# Patient Record
Sex: FEMALE | Race: BLACK OR AFRICAN AMERICAN | Employment: UNEMPLOYED | URBAN - METROPOLITAN AREA
[De-identification: names, ages, dates, MRNs, and addresses within clinical notes are randomized per-mention and may not be internally consistent; named-entity substitution may affect disease eponyms.]

---

## 2021-07-30 ENCOUNTER — TRANSCRIBE ORDER (OUTPATIENT)
Dept: SCHEDULING | Age: 46
End: 2021-07-30

## 2021-07-30 DIAGNOSIS — Z12.31 VISIT FOR SCREENING MAMMOGRAM: Primary | ICD-10-CM

## 2021-08-09 ENCOUNTER — HOSPITAL ENCOUNTER (OUTPATIENT)
Dept: MAMMOGRAPHY | Age: 46
Discharge: HOME | End: 2021-08-09
Attending: FAMILY MEDICINE
Payer: MEDICAID

## 2021-08-09 DIAGNOSIS — Z12.31 VISIT FOR SCREENING MAMMOGRAM: ICD-10-CM

## 2021-08-09 PROCEDURE — 77063 BREAST TOMOSYNTHESIS BI: CPT

## 2021-11-02 ENCOUNTER — HOSPITAL ENCOUNTER (EMERGENCY)
Age: 46
Discharge: HOME | End: 2021-11-02
Attending: EMERGENCY MEDICINE | Admitting: EMERGENCY MEDICINE
Payer: MEDICAID

## 2021-11-02 VITALS
WEIGHT: 170 LBS | HEART RATE: 98 BPM | RESPIRATION RATE: 18 BRPM | DIASTOLIC BLOOD PRESSURE: 90 MMHG | TEMPERATURE: 97.9 F | HEIGHT: 64 IN | BODY MASS INDEX: 29.02 KG/M2 | SYSTOLIC BLOOD PRESSURE: 147 MMHG | OXYGEN SATURATION: 100 %
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
## 2021-11-02 DIAGNOSIS — S16.1XXA STRAIN OF NECK MUSCLE, INITIAL ENCOUNTER: ICD-10-CM

## 2021-11-02 DIAGNOSIS — S06.0X0A CONCUSSION WITHOUT LOSS OF CONSCIOUSNESS, INITIAL ENCOUNTER: Primary | ICD-10-CM

## 2021-11-02 PROCEDURE — 99282 EMERGENCY DEPT VISIT SF MDM: CPT

## 2021-11-02 RX ORDER — METHOCARBAMOL 750 MG/1
1500 TABLET, FILM COATED ORAL
Qty: 30 TABLET | Refills: 0 | Status: SHIPPED | OUTPATIENT
Start: 2021-11-02 | End: 2022-03-10

## 2021-11-02 RX ORDER — NAPROXEN 500 MG/1
500 TABLET ORAL
Qty: 20 TABLET | Refills: 0 | Status: SHIPPED | OUTPATIENT
Start: 2021-11-02 | End: 2021-11-12

## 2021-11-02 NOTE — ED TRIAGE NOTES
Discharge teaching provided for pt regarding treatment received, medications prescribed, and follow up care. Pt verbalized understanding of all discharge instructions. All questions answered. Pt left ambulatory with discharge paperwork in hand.

## 2021-11-02 NOTE — Clinical Note
[address] [place] [address] [facility]ERGENCY DEPT 
[address] [facility] [address] [phone] Work/School Note Date: 11/2/2021 To Whom It May concern: Bisi Kitchen was seen and treated today in the emergency room by the following provider(s): 
Attending Provider: John Mccrary MD 
Nurse Practitioner: BLANCA Mccabe. Bisi Kitchen is excused from work/school on 11/2/2021 through 11/5/2021. She is medically clear to return to work/school on 11/6/2021.   
  
 
Sincerely, 
 
 
 
 
BLANCA Jung
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
## 2021-11-09 ENCOUNTER — TELEPHONE (OUTPATIENT)
Dept: ORTHOPEDIC SURGERY | Age: 46
End: 2021-11-09
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
## 2021-11-15 ENCOUNTER — OFFICE VISIT (OUTPATIENT)
Dept: ORTHOPEDIC SURGERY | Age: 46
End: 2021-11-15
Payer: MEDICAID

## 2021-11-15 VITALS
WEIGHT: 173 LBS | RESPIRATION RATE: 15 BRPM | BODY MASS INDEX: 29.53 KG/M2 | HEART RATE: 85 BPM | HEIGHT: 64 IN | OXYGEN SATURATION: 98 %
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
## 2021-11-15 DIAGNOSIS — S06.0X0A CONCUSSION WITHOUT LOSS OF CONSCIOUSNESS, INITIAL ENCOUNTER: Primary | ICD-10-CM

## 2021-11-15 PROCEDURE — 99204 OFFICE O/P NEW MOD 45 MIN: CPT | Performed by: FAMILY MEDICINE

## 2021-11-15 RX ORDER — NAPROXEN 500 MG/1
500 TABLET ORAL 2 TIMES DAILY WITH MEALS
COMMUNITY
End: 2022-08-11

## 2021-11-15 RX ORDER — AZELASTINE 1 MG/ML
SPRAY, METERED NASAL
COMMUNITY
Start: 2021-08-27

## 2021-11-15 RX ORDER — TRAZODONE HYDROCHLORIDE 50 MG/1
50-100 TABLET ORAL
Qty: 60 TABLET | Refills: 2 | Status: SHIPPED | OUTPATIENT
Start: 2021-11-15 | End: 2022-03-10 | Stop reason: SDUPTHER
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
## 2021-11-15 NOTE — PATIENT INSTRUCTIONS
Stare at the tip of a pen or pencil and bring toward your eye until it just starts to bother you. Hold for 5 seconds then move it away. Repeat 10 times a set, and do 3 sets a day.            Search YouTube for my channel:    Dr. Luiz Chaudhry

## 2021-11-15 NOTE — LETTER
NOTIFICATION RETURN TO WORK / SCHOOL    11/15/2021 3:29 PM    Ms. David Gonsalves  [id number] [id number] [place] [id number]      To Whom It May Concern: David Gonsalves is currently under the care of S[facility] Yun 2.. Off work until follow-up 12/2/2021. If there are questions or concerns please have the patient contact our office.         Sincerely,      Jorge Alberto Jose, DO

## 2021-12-02 ENCOUNTER — OFFICE VISIT (OUTPATIENT)
Dept: ORTHOPEDIC SURGERY | Age: 46
End: 2021-12-02
Payer: MEDICAID

## 2021-12-02 VITALS
RESPIRATION RATE: 15 BRPM | BODY MASS INDEX: 29.37 KG/M2 | HEIGHT: 64 IN | HEART RATE: 78 BPM | OXYGEN SATURATION: 98 % | WEIGHT: 172 LBS

## 2021-12-02 DIAGNOSIS — S06.0X0D CONCUSSION WITHOUT LOSS OF CONSCIOUSNESS, SUBSEQUENT ENCOUNTER: Primary | ICD-10-CM
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
## 2021-12-02 PROCEDURE — 99215 OFFICE O/P EST HI 40 MIN: CPT | Performed by: FAMILY MEDICINE
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
## 2021-12-02 NOTE — PROGRESS NOTES
Pt has difficulty with word finding, dizziness periodically, flipping letters and words/ images around. Pt states that PT is starting tomorrow.

## 2021-12-03 ENCOUNTER — HOSPITAL ENCOUNTER (OUTPATIENT)
Dept: PHYSICAL THERAPY | Age: 46
Discharge: HOME | End: 2021-12-03
Attending: FAMILY MEDICINE
Payer: MEDICAID

## 2021-12-03 PROCEDURE — 97162 PT EVAL MOD COMPLEX 30 MIN: CPT
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
## 2021-12-03 NOTE — PROGRESS NOTES
PT DAILY TREATMENT NOTE 10-18    Patient Name: Jessica Phan  Date:12/3/2021  : 1975  [x]  Patient  Verified  Payor: Gene Hernandes / [place] / Product Type: Managed Care Medicaid /    In time:1102  Out time:1139  Total Treatment Time (min): 37  Visit #: 1 of 8      Treatment Area: Post concussion syndrome [F07.81]    SUBJECTIVE  Pain Level (0-10 scale): 5  Any medication changes, allergies to medications, adverse drug reactions, diagnosis change, or new procedure performed?: [x] No    [] Yes (see summary sheet for update)  Subjective functional status/changes:   [] No changes reported  See eval    OBJECTIVE      37 min [x]Eval                  []Re-Eval       With   [] TE   [] TA   [] neuro   [] other: Patient Education: [x] Review HEP    [] Progressed/Changed HEP based on:   [] positioning   [] body mechanics   [] transfers   [] heat/ice application    [] other:      Other Objective/Functional Measures:      Pain Level (0-10 scale) post treatment: 5    ASSESSMENT/Changes in Function: see POC    Patient will continue to benefit from skilled PT services to modify and progress therapeutic interventions, address functional mobility deficits, address ROM deficits, address strength deficits, analyze and address soft tissue restrictions, analyze and cue movement patterns and assess and modify postural abnormalities to attain remaining goals. [x]  See Plan of Care  []  See progress note/recertification  []  See Discharge Summary         Progress towards goals / Updated goals:  Short Term Goals: To be accomplished in 2 weeks:  1. I and compliant with HEP for self management of symptoms. IE: issued HEP   2. Decrease CSI by 20 points to indicate improved function for ADLs. IE: 80  Long Term Goals: To be accomplished in 4 weeks:  1. Improve FOTO to 59 to indicate improved function with daily activities. IE: 52  2. Decrease CSI to <=10 to indicate improved function for work tasks.   IE: 119  3. Improve convergence to <= 6 cm to increase ease with reading. IE: 25 cm   4. 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    IE: 2-3 HAs/week  PLAN  []  Upgrade activities as tolerated     [x]  Continue plan of care  []  Update interventions per flow sheet       []  Discharge due to:_  []  Other:_      Noam Deluca, MPT, CMTPT 12/3/2021  12:24 PM    Future Appointments   Date Time Provider Shahid Mora   12/8/2021 12:00 PM Oksana Melgar, PT [facility]   12/10/2021  9:00 AM Werner Teixeira, PT [facility]   12/13/2021  3:30 PM Dominique Bowels, DPT [facility]   12/15/2021 10:15 AM Dominique Bowels, DPT [facility]   12/20/2021  2:00 PM Dominique Bowels, DPT [facility]   12/22/2021 10:15 AM Dominique Bowels, DPT [facility]   12/23/2021 11:00 AM DO MARISSA Núñez BS [facility]   12/27/2021  3:45 PM Werner Teixeira PT [facility]   12/29/2021  9:00 AM Werner Teixeira PT [facility]

## 2021-12-08 ENCOUNTER — HOSPITAL ENCOUNTER (OUTPATIENT)
Dept: PHYSICAL THERAPY | Age: 46
Discharge: HOME | End: 2021-12-08
Attending: FAMILY MEDICINE
Payer: MEDICAID

## 2021-12-08 PROCEDURE — 97530 THERAPEUTIC ACTIVITIES: CPT

## 2021-12-08 PROCEDURE — 97112 NEUROMUSCULAR REEDUCATION: CPT

## 2021-12-08 NOTE — PROGRESS NOTES
PT DAILY TREATMENT NOTE     Patient Name: Andrea Caal  Date:2021  : 1975  [x]  Patient  Verified  Payor: Yanna Lloyd / Plan: [id number] [employer] / Product Type: Managed Care Medicaid /    In time:1203pm  Out time:1249pm  Total Treatment Time (min): 55  Visit #: 2 of 8     Treatment Area: Post concussion syndrome [F07.81]    SUBJECTIVE  Pain Level (0-10 scale): 5  Any medication changes, allergies to medications, adverse drug reactions, diagnosis change, or new procedure performed?: [x] No    [] Yes (see summary sheet for update)  Subjective functional status/changes:   [] No changes reported  Reports focus has been challenging and she has been very fatigued by the end of the day. Looking at emails is an aggravator for sx. OBJECTIVE    23 min Therapeutic Activity:  [x]  See flow sheet : saccades, smooth pursuits, pencil pushes   Rationale: improve coordination  to improve the patients ability to read and write with improved focus. 8 min Neuromuscular Re-education:  [x]  See flow sheet :   Rationale: increase strength, improve coordination, improve balance and increase proprioception  to improve the patients ability to ambulate with improved stability    15 min Manual Therapy:  Pt supine with wedge -- SOR, STM c/s paraspinals, scalenes, and BUT   The manual therapy interventions were performed at a separate and distinct time from the therapeutic activities interventions. Rationale: decrease pain, increase ROM and increase tissue extensibility to improve ease of ADLs and self care.            With   [] TE   [] TA   [] neuro   [] other: Patient Education: [x] Review HEP    [] Progressed/Changed HEP based on:   [] positioning   [] body mechanics   [] transfers   [] heat/ice application    [] other:      Other Objective/Functional Measures: exercises initiated for first f/u per flowsheet     Pain Level (0-10 scale) post treatment: 5    ASSESSMENT/Changes in Function: Pt performs all exercises as directed, but does get quick blurring of vision with visual activities of gaze to the right and gaze down. Challenged by tandem positioning and eyes closed activities. Patient will continue to benefit from skilled PT services to modify and progress therapeutic interventions, address functional mobility deficits, address ROM deficits, address strength deficits, analyze and address soft tissue restrictions, analyze and cue movement patterns, analyze and modify body mechanics/ergonomics and assess and modify postural abnormalities to attain remaining goals. []  See Plan of Care  []  See progress note/recertification  []  See Discharge Summary         Progress towards goals / Updated goals:  Short Term Goals: To be accomplished in 2 weeks:  1. I and compliant with HEP for self management of symptoms. IE: issued HEP   Current: progressing, has been trying once a day, but having difficulty with focus. (12/8/2021)  2. Decrease CSI by 20 points to indicate improved function for ADLs. IE: [address] be accomplished in 4 weeks:  1. Improve FOTO IN 31 YY indicate improved function with daily activities. IE: 52  2. Decrease CSI to <=10 to indicate improved function for work tasks. IE: 119  3. Improve convergence to <= 6 cm to increase ease with reading. 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    PLAN  []  Upgrade activities as tolerated     [x]  Continue plan of care  []  Update interventions per flow sheet       []  Discharge due to:_  []  Other:_      Tyson Casey, PT 12/8/2021  12:32 PM    Future Appointments   Date Time Provider Shahid Mora   12/10/2021  9:00 AM [facility]   12/13/2021  3:30 PM JOVANA MathurT [facility]   12/15/2021 10:15 AM Priscila Palmer DPT [facility]   12/20/2021  2:00 PM Priscila Palmer DPT [facility]   12/22/2021 10:15 AM Hira Culver MY, DPT [facility]   12/23/2021 11:00 AM DO MARISSA Hermosillo BS AMB   12/27/2021  3:45 PM Louis Steen, PT [facility]   12/29/2021  9:00 AM Louis Steen, PT [facility]

## 2021-12-10 ENCOUNTER — HOSPITAL ENCOUNTER (OUTPATIENT)
Dept: PHYSICAL THERAPY | Age: 46
Discharge: HOME | End: 2021-12-10
Attending: FAMILY MEDICINE
Payer: MEDICAID

## 2021-12-10 PROCEDURE — 97112 NEUROMUSCULAR REEDUCATION: CPT

## 2021-12-10 PROCEDURE — 97110 THERAPEUTIC EXERCISES: CPT

## 2021-12-10 PROCEDURE — 97530 THERAPEUTIC ACTIVITIES: CPT

## 2021-12-10 NOTE — PROGRESS NOTES
PT DAILY TREATMENT NOTE 10-18    Patient Name: Daniel Granados  Date:12/10/2021  : 1975  [x]  Patient  Verified  Payor: Conception Dapper / Plan: [id number] [address] [place] / Product Type: Managed Care Medicaid /    In time:907  Out time:948  Total Treatment Time (min): 41  Visit #: 3 of 8   7' late  Treatment Area: Post concussion syndrome [F07.81]    SUBJECTIVE  Pain Level (0-10 scale): 0  Any medication changes, allergies to medications, adverse drug reactions, diagnosis change, or new procedure performed?: [x] No    [] Yes (see summary sheet for update)  Subjective functional status/changes:   [] No changes reported  No pain, just dizzy and my balance is off. Sorry I'm late today. I just found out my boyfriend has to go back to [place]. OBJECTIVE  8 min Therapeutic Exercise:  [] See flow sheet :   Rationale: increase ROM to improve the patients ability to perform ADLs      13 min Therapeutic Activity:  []  See flow sheet :   Rationale: improve coordination, improve balance and increase proprioception  to improve the patients ability to perform ADLs with less dizziness     15 min Neuromuscular Re-education:  []  See flow sheet :   Rationale: increase strength, improve coordination, improve balance and increase proprioception  to improve the patients ability to perform daily activities     5 min Manual Therapy:  SOR; STM B C/S paraspinals, UTs, scalenes   The manual therapy interventions were performed at a separate and distinct time from the therapeutic activities interventions.   Rationale: decrease pain, increase ROM, increase tissue extensibility and decrease trigger points to help with cervicogenic dizziness and post-concussion symptoms  With   [] TE   [] TA   [] neuro   [] other: Patient Education: [x] Review HEP    [] Progressed/Changed HEP based on:   [] positioning   [] body mechanics   [] transfers   [] heat/ice application    [] other:      Other Objective/Functional Measures: CSI 95     Pain Level (0-10 scale) post treatment: 0     ASSESSMENT/Changes in Function: Patient presents with increased stress today, as she just found out her boyfriend is getting deployed. Encouraged patient to continue to listen to her body and take breaks and naps when she feels tired. Right eye fatigues with saccades and smooth pursuit. No LOB with static or dynamic balance. Patient will continue to benefit from skilled PT services to modify and progress therapeutic interventions, analyze and address soft tissue restrictions, analyze and cue movement patterns and address imbalance/dizziness to attain remaining goals. []  See Plan of Care  []  See progress note/recertification  []  See Discharge Summary         Progress towards goals / Updated goals:  Short Term Goals: To be accomplished in 2 weeks:  1. I and compliant with HEP for self management of symptoms. IE: issued HEP   Current: progressing, has been trying once a day, but having difficulty with focus. (12/8/2021)  2. Decrease CSI by 20 points to indicate improved function for ADLs. IE: 119  Current: 95- goal met 12/10/21  Long Term Goals: To be accomplished in 4 weeks:  1. Improve FOTO IG 57 CJ indicate improved function with daily activities.   IE: 47  2. Decrease CSI to <=10 to indicate improved function for work tasks. IE: 119  3. Improve convergence to <= 6 cm to increase ease with reading.   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    PLAN  []  Upgrade activities as tolerated     [x]  Continue plan of care  []  Update interventions per flow sheet       []  Discharge due to:_  []  Other:_      Velasquez Garcia, MPT, CMTPT 12/10/2021  8:47 AM    Future Appointments   Date Time Provider Shahid Mora   12/10/2021  9:00 AM Araceli Santana, PT [facility]   12/14/2021  1:00 PM Alisha Ortiz PTA [facility]   12/15/2021 10:15 AM Kendra Patel DPT [facility]   12/20/2021  1:00 PM Gray Clamp, PTA [facility]   12/22/2021 10:15 AM Felicia Means, DPT [facility]   12/23/2021 11:00 AM DO MARISSA Mcdonald BS AMB   12/27/2021 11:15 AM Judy Dai, PT [facility]   12/29/2021  9:00 AM Judy Dai, PT [facility]

## 2021-12-13 ENCOUNTER — APPOINTMENT (OUTPATIENT)
Dept: PHYSICAL THERAPY | Age: 46
End: 2021-12-13
Attending: FAMILY MEDICINE
Payer: MEDICAID

## 2021-12-15 ENCOUNTER — HOSPITAL ENCOUNTER (OUTPATIENT)
Dept: PHYSICAL THERAPY | Age: 46
Discharge: HOME | End: 2021-12-15
Attending: FAMILY MEDICINE
Payer: MEDICAID

## 2021-12-15 PROCEDURE — 97110 THERAPEUTIC EXERCISES: CPT | Performed by: PHYSICAL THERAPIST

## 2021-12-15 PROCEDURE — 97116 GAIT TRAINING THERAPY: CPT | Performed by: PHYSICAL THERAPIST

## 2021-12-15 PROCEDURE — 97112 NEUROMUSCULAR REEDUCATION: CPT | Performed by: PHYSICAL THERAPIST

## 2021-12-15 NOTE — PROGRESS NOTES
PT DAILY TREATMENT NOTE     Patient Name: Shin Garcia  Date:12/15/2021  : 1975  [x]  Patient  Verified  Payor: Cy Hammans / [place] [employer] / Product Type: Managed Care Medicaid /    In time:1026  Out time:1110  Total Treatment Time (min): 34  Visit #: 4 of 8  11' late    Treatment Area: Post concussion syndrome [F07.81]    SUBJECTIVE  Pain Level (0-10 scale): 0/10 pain  Any medication changes, allergies to medications, adverse drug reactions, diagnosis change, or new procedure performed?: [x] No    [] Yes (see summary sheet for update)  Subjective functional status/changes:   [] No changes reported  Pt reports she continues to be dizzy but does not have pain at this time    OBJECTIVE    8 min Therapeutic Exercise:  [x] See flow sheet :   Rationale: increase ROM and increase strength to improve the patients ability to improve activity tolerance and post concussion symptoms      18 min Neuromuscular Re-education:  [x]  See flow sheet : balance and vestibular ex's per flow sheet   Rationale: improve coordination, improve balance and increase proprioception  to improve the patients ability to improve gait and activity tolerance     8 min Gait Training:  Dynamic gait per flow sheet   Rationale: improve balance and coordination for greater ease w/ grocery shopping          With   [] TE   [] TA   [] neuro   [] other: Patient Education: [x] Review HEP    [] Progressed/Changed HEP based on:   [] positioning   [] body mechanics   [] transfers   [] heat/ice application    [] other:      Other Objective/Functional Measures: CSI = 87     Pain Level (0-10 scale) post treatment: 5/10 dizziness, no pain     ASSESSMENT/Changes in Function: Held manual today 2' no pain reported. Progressed ex's per flow sheet in attempt to improve symptoms. Educated on vestibular changes and how ex's are designed to create improvements.  Educated also on allowing breaks when symptoms begin to elevate to allow better recovery. Patient will continue to benefit from skilled PT services to modify and progress therapeutic interventions, address functional mobility deficits, address ROM deficits, analyze and address soft tissue restrictions, analyze and cue movement patterns, analyze and modify body mechanics/ergonomics, assess and modify postural abnormalities, address imbalance/dizziness and instruct in home and community integration to attain remaining goals. []  See Plan of Care  []  See progress note/recertification  []  See Discharge Summary         Progress towards goals / Updated goals:  Short Term Goals: To be accomplished in 2 weeks:  1. I and compliant with HEP for self management of symptoms. IE: issued HEP   Current: met per pt report 12/15/2021  2. Decrease CSI by 20 points to indicate improved function for ADLs. IE: 119  Current: 95- goal met 12/10/21  Long Term Goals: To be accomplished in 4 weeks:  1. Improve FOTO EP 26 KH indicate improved function with daily activities.   IE: 47  2. Decrease CSI to <=10 to indicate improved function for work tasks. IE: 119  3. Improve convergence to <= 6 cm to increase ease with reading.   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

## 2021-12-20 ENCOUNTER — APPOINTMENT (OUTPATIENT)
Dept: PHYSICAL THERAPY | Age: 46
End: 2021-12-20
Attending: FAMILY MEDICINE
Payer: MEDICAID

## 2021-12-22 ENCOUNTER — HOSPITAL ENCOUNTER (OUTPATIENT)
Dept: PHYSICAL THERAPY | Age: 46
Discharge: HOME | End: 2021-12-22
Attending: FAMILY MEDICINE
Payer: MEDICAID

## 2021-12-22 PROCEDURE — 97110 THERAPEUTIC EXERCISES: CPT | Performed by: PHYSICAL THERAPIST

## 2021-12-22 PROCEDURE — 97112 NEUROMUSCULAR REEDUCATION: CPT | Performed by: PHYSICAL THERAPIST

## 2021-12-22 PROCEDURE — 97116 GAIT TRAINING THERAPY: CPT | Performed by: PHYSICAL THERAPIST

## 2021-12-22 NOTE — PROGRESS NOTES
PT DAILY TREATMENT NOTE     Patient Name: Leatha Siddiqui  Date:2021  : 1975  [x]  Patient  Verified  Payor: Dixon Arzola / [place] [employer] / Product Type: Managed Care Medicaid /    In time:1035  Out time:1123  Total Treatment Time (min): 48  Visit #: 5 of 8  20' late - reprinted calendar     Treatment Area: Post concussion syndrome [F07.81]    SUBJECTIVE  Pain Level (0-10 scale): 6/10 overall symptoms  Any medication changes, allergies to medications, adverse drug reactions, diagnosis change, or new procedure performed?: [x] No    [] Yes (see summary sheet for update)  Subjective functional status/changes:   [] No changes reported  Pt reports she is feeling better overall but not quite herself    OBJECTIVE    10 min Therapeutic Exercise:  [x] See flow sheet : progressed per flow sheet   Rationale: increase ROM to improve the patients ability to improve activity tolerance      28 min Neuromuscular Re-education:  [x]  See flow sheet : balance and vestibular ex's per flow sheet   Rationale: improve coordination, improve balance and increase proprioception  to improve the patients ability to decrease fall risk and improve concussion symptoms    10 min Gait Training: Dynamic gait per flow sheet   Rationale: improve balance and coordination for greater ease w/ grocery shopping          With   [] TE   [] TA   [] neuro   [] other: Patient Education: [x] Review HEP    [] Progressed/Changed HEP based on:   [] positioning   [] body mechanics   [] transfers   [] heat/ice application    [] other:      Other Objective/Functional Measures: see goals below     Pain Level (0-10 scale) post treatment: 6/10    ASSESSMENT/Changes in Function: Pt is making good progress and able to reports about 50% overall improvement. Continues w/ right eye visual weakness reports but it is also improving.  Reports her emotions are still \"all over the place\" but she is able to focus on tasks better and for a longer period of time. Patient will continue to benefit from skilled PT services to modify and progress therapeutic interventions, address functional mobility deficits, address ROM deficits, address strength deficits, analyze and address soft tissue restrictions, analyze and cue movement patterns, analyze and modify body mechanics/ergonomics, assess and modify postural abnormalities, address imbalance/dizziness and instruct in home and community integration to attain remaining goals. []  See Plan of Care  []  See progress note/recertification  []  See Discharge Summary         Progress towards goals / Updated goals:  Short Term Goals: To be accomplished in 2 weeks:  1. I and compliant with HEP for self management of symptoms. IE: issued HEP   Current: met per pt report 12/15/2021  2. Decrease CSI by 20 points to indicate improved function for ADLs. IE: 119  Current: 95- goal met 12/10/21  Long Term Goals: To be accomplished in 4 weeks:  1. Improve FOTO LL 19 YM indicate improved function with daily activities.   IE: 47  Current: progressing, FOTO = 53 12/22/21  2. Decrease CSI to <=10 to indicate improved function for work tasks. IE: 119  Current: progressing, CSI = 77  12/22/21  3. Improve convergence to <= 6 cm to increase ease with reading.   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

## 2021-12-22 NOTE — PROGRESS NOTES
[facility]  [address]  [place], [address]  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 I and compliant with HEP for self management of symptoms. IE: issued HEP   Current: met per pt report   2. Decrease CSI by 20 points to indicate improved function for ADLs. IE: 119  Current: 95- goal met  Long Term Goals: To be accomplished in 4 weeks:  1. Improve FOTO to 59 to indicate improved function with daily activities. IE: 52  Current: progressing, FOTO = 53   2. Decrease CSI to <=10 to indicate improved function for work tasks. IE: 119  Current: progressing, CSI = 77    3. Improve convergence to <= 6 cm to increase ease with reading. 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 Improve FOTO to 59 to indicate improved function with daily activities. PN: progressing, FOTO = 53   2. Decrease CSI to <=10 to indicate improved function for work tasks.   PN: progressing, CSI = 77    3. Improve convergence to <= 6 cm to increase ease with reading. PN: progressing, 16cm  4. 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 PN: progressing, reports 3-4 HAs a week but states intensity is less and duration is less     ASSESSMENT/RECOMMENDATIONS:  Pt is making good progress and able to reports about 50% overall improvement. Continues w/ right eye visual weakness reports but it is also improving. Reports her emotions are still \"all over the place\" but she is able to focus on tasks better and for a longer period of time. Patient will continue to benefit from skilled PT services to modify and progress therapeutic interventions, address functional mobility deficits, address ROM deficits, address strength deficits, analyze and address soft tissue restrictions, analyze and cue movement patterns, analyze and modify body mechanics/ergonomics, assess and modify postural abnormalities, address imbalance/dizziness and instruct in home and community integration to attain remaining goals.     [x]Continue therapy per initial plan/protocol at a frequency of  1-2 x per week for 4 weeks  []Continue therapy with the following recommended changes:_____________________      _____________________________________________________________________  []Discontinue therapy progressing towards or have reached established goals  []Discontinue therapy due to lack of appreciable progress towards goals  []Discontinue therapy due to lack of attendance or compliance  []Await Physician's recommendations/decisions regarding therapy  []Other:________________________________________________________________    Thank you for this referral.    Indiana Hardy DPT 12/22/2021 10:54 AM  NOTE TO PHYSICIAN:  PLEASE COMPLETE THE ORDERS BELOW AND   FAX TO [facility]: [phone]  If you are unable to process this request in 24 hours please contact our office: [phone]    [x]  I have read the above report and request that my patient continue as recommended. I have read the above report and request that my patient continue therapy with the following changes/special instructions:__________________________________________________________  I have read the above report and request that my patient be discharged from therapy.     Physicians signature: ______________________________Date: ______Time:______     Johnny Branham DO

## 2021-12-23 ENCOUNTER — OFFICE VISIT (OUTPATIENT)
Dept: ORTHOPEDIC SURGERY | Age: 46
End: 2021-12-23
Payer: MEDICAID

## 2021-12-23 VITALS
HEART RATE: 90 BPM | OXYGEN SATURATION: 98 % | RESPIRATION RATE: 15 BRPM | HEIGHT: 64 IN | BODY MASS INDEX: 29.3 KG/M2 | WEIGHT: 171.6 LBS
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
## 2021-12-23 DIAGNOSIS — S06.0X0D CONCUSSION WITHOUT LOSS OF CONSCIOUSNESS, SUBSEQUENT ENCOUNTER: Primary | ICD-10-CM

## 2021-12-23 PROCEDURE — 99214 OFFICE O/P EST MOD 30 MIN: CPT | Performed by: FAMILY MEDICINE

## 2021-12-23 NOTE — LETTER
NOTIFICATION RETURN TO WORK / SCHOOL    12/23/2021 11:26 AM    Ms. Best Taylor  [phone] [facility] [address]      To Whom It May Concern: Best Taylor is currently under the care of [facility].. She will return to work/school on: 1/3/2021. Limit up to 4 hours per day up to 5 days per week. Allow accommodations for work station/computer screen and lights (sunglasses/ear plugs if needed). Will return 3 weeks. If there are questions or concerns please have the patient contact our office.         Sincerely,      Ramakrishna Keene, DO

## 2021-12-23 NOTE — LETTER
12/23/2021    Patient: Ryann Ojeda   YOB: 1975   Date of Visit: 12/23/2021     Anca Herrera DO  [address] [facility] [address]  Via Fax: [phone]    Dear Anca Herrera DO,      Thank you for referring Ms. Eda Peña to [place] [address]. for evaluation. My notes for this consultation are attached. If you have questions, please do not hesitate to call me. I look forward to following your patient along with you.       Sincerely,    Erin Griffiths DO
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
## 2021-12-23 NOTE — PROGRESS NOTES
Multitasking causes some issues. Memory loss, word finding and spelling things are getting better but still having issues. Pt states that the pressure to the back of the head has gotten less noticeable. Periods of dizziness. Objects close by is blurred but far away is clearer.

## 2021-12-27 ENCOUNTER — APPOINTMENT (OUTPATIENT)
Dept: PHYSICAL THERAPY | Age: 46
End: 2021-12-27
Attending: FAMILY MEDICINE
Payer: MEDICAID

## 2021-12-28 ENCOUNTER — HOSPITAL ENCOUNTER (EMERGENCY)
Age: 46
Discharge: HOME | End: 2021-12-29
Attending: STUDENT IN AN ORGANIZED HEALTH CARE EDUCATION/TRAINING PROGRAM
Payer: MEDICAID

## 2021-12-28 VITALS
HEIGHT: 64 IN | WEIGHT: 165 LBS | OXYGEN SATURATION: 99 % | DIASTOLIC BLOOD PRESSURE: 90 MMHG | RESPIRATION RATE: 17 BRPM | BODY MASS INDEX: 28.17 KG/M2 | HEART RATE: 112 BPM | SYSTOLIC BLOOD PRESSURE: 136 MMHG | TEMPERATURE: 99.4 F

## 2021-12-28 DIAGNOSIS — E86.0 DEHYDRATION: ICD-10-CM

## 2021-12-28 DIAGNOSIS — Z20.822 SUSPECTED COVID-19 VIRUS INFECTION: Primary | ICD-10-CM

## 2021-12-28 LAB
ALBUMIN SERPL-MCNC: 3.9 G/DL (ref 3.4–5)
ALBUMIN/GLOB SERPL: 0.9 {RATIO} (ref 0.8–1.7)
ALP SERPL-CCNC: 97 U/L (ref 45–117)
ALT SERPL-CCNC: 17 U/L (ref 13–56)
ANION GAP SERPL CALC-SCNC: 5 MMOL/L (ref 3–18)
AST SERPL-CCNC: 18 U/L (ref 10–38)
BASOPHILS # BLD: 0 K/UL (ref 0–0.1)
BASOPHILS NFR BLD: 0 % (ref 0–2)
BILIRUB SERPL-MCNC: 0.3 MG/DL (ref 0.2–1)
BUN SERPL-MCNC: 10 MG/DL (ref 7–18)
BUN/CREAT SERPL: 11 (ref 12–20)
CALCIUM SERPL-MCNC: 9.5 MG/DL (ref 8.5–10.1)
CHLORIDE SERPL-SCNC: 104 MMOL/L (ref 100–111)
CO2 SERPL-SCNC: 26 MMOL/L (ref 21–32)
CREAT SERPL-MCNC: 0.89 MG/DL (ref 0.6–1.3)
DIFFERENTIAL METHOD BLD: ABNORMAL
EOSINOPHIL # BLD: 0 K/UL (ref 0–0.4)
EOSINOPHIL NFR BLD: 0 % (ref 0–5)
ERYTHROCYTE [DISTWIDTH] IN BLOOD BY AUTOMATED COUNT: 13.8 % (ref 11.6–14.5)
GLOBULIN SER CALC-MCNC: 4.5 G/DL (ref 2–4)
GLUCOSE SERPL-MCNC: 124 MG/DL (ref 74–99)
HCG SERPL QL: NEGATIVE
HCT VFR BLD AUTO: 41.4 % (ref 35–45)
HGB BLD-MCNC: 13.5 G/DL (ref 12–16)
IMM GRANULOCYTES # BLD AUTO: 0 K/UL (ref 0–0.04)
IMM GRANULOCYTES NFR BLD AUTO: 0 % (ref 0–0.5)
LIPASE SERPL-CCNC: 88 U/L (ref 73–393)
LYMPHOCYTES # BLD: 0.2 K/UL (ref 0.9–3.6)
LYMPHOCYTES NFR BLD: 2 % (ref 21–52)
MCH RBC QN AUTO: 27.2 PG (ref 24–34)
MCHC RBC AUTO-ENTMCNC: 32.6 G/DL (ref 31–37)
MCV RBC AUTO: 83.3 FL (ref 78–100)
MONOCYTES # BLD: 0.4 K/UL (ref 0.05–1.2)
MONOCYTES NFR BLD: 5 % (ref 3–10)
NEUTS SEG # BLD: 6.8 K/UL (ref 1.8–8)
NEUTS SEG NFR BLD: 92 % (ref 40–73)
NRBC # BLD: 0 K/UL (ref 0–0.01)
NRBC BLD-RTO: 0 PER 100 WBC
PLATELET # BLD AUTO: 281 K/UL (ref 135–420)
PMV BLD AUTO: 9.6 FL (ref 9.2–11.8)
POTASSIUM SERPL-SCNC: 3.6 MMOL/L (ref 3.5–5.5)
PROT SERPL-MCNC: 8.4 G/DL (ref 6.4–8.2)
RBC # BLD AUTO: 4.97 M/UL (ref 4.2–5.3)
SODIUM SERPL-SCNC: 135 MMOL/L (ref 136–145)
WBC # BLD AUTO: 7.4 K/UL (ref 4.6–13.2)

## 2021-12-28 PROCEDURE — 99282 EMERGENCY DEPT VISIT SF MDM: CPT

## 2021-12-28 PROCEDURE — 85025 COMPLETE CBC W/AUTO DIFF WBC: CPT

## 2021-12-28 PROCEDURE — 83690 ASSAY OF LIPASE: CPT

## 2021-12-28 PROCEDURE — 80053 COMPREHEN METABOLIC PANEL: CPT

## 2021-12-28 PROCEDURE — 81001 URINALYSIS AUTO W/SCOPE: CPT

## 2021-12-28 PROCEDURE — 84703 CHORIONIC GONADOTROPIN ASSAY: CPT

## 2021-12-28 RX ORDER — ONDANSETRON 2 MG/ML
4 INJECTION INTRAMUSCULAR; INTRAVENOUS
Status: COMPLETED | OUTPATIENT
Start: 2021-12-28 | End: 2021-12-29

## 2021-12-28 NOTE — Clinical Note
----- Message from Gregory Vega MD sent at 1/9/2017  2:27 PM CST -----  Mild arthritis/narrowing laterally.  No changes medially.  Remainder of exam normal.  With pains posterior medial location, follow-up next week as planned.  If still with significant discomfort will need MRI.   [address] [place] [address] Dr [facility] EMERGENCY DEPT  [phone] [facility] [address] [phone]    Work/School Note    Date: 12/28/2021     To Whom It May concern: Harry Senior was evaluated by the following provider(s):  Attending Provider: Alesha Rios DO  Physician Assistant: Luz Tena virus is suspected. Per the CDC guidelines we recommend home isolation until the following conditions are all met:    1. At least 10 days have passed since symptoms first appeared and  2. At least 24 hours have passed since last fever without the use of fever-reducing medications and  3.  Symptoms (e.g., cough, shortness of breath) have improved      Sincerely,          Mi Johnston PA-C

## 2021-12-28 NOTE — Clinical Note
[address] [place] [address] [address] [facility] EMERGENCY DEPT  [id number] [facility] [address] [address] [phone]    Work/School Note    Date: 12/28/2021     To Whom It May concern: Nathaniel Ruth was evaluated by the following provider(s):  Attending Provider: Bry Tate DO  Physician Assistant: Surjit Sine virus is suspected. Per the CDC guidelines we recommend home isolation until the following conditions are all met:    1. At least 10 days have passed since symptoms first appeared and  2. At least 24 hours have passed since last fever without the use of fever-reducing medications and  3.  Symptoms (e.g., cough, shortness of breath) have improved      Sincerely,          Mi Johnston PA-C

## 2021-12-29 ENCOUNTER — HOSPITAL ENCOUNTER (OUTPATIENT)
Dept: PHYSICAL THERAPY | Age: 46
End: 2021-12-29
Attending: FAMILY MEDICINE
Payer: MEDICAID

## 2021-12-29 LAB
APPEARANCE UR: ABNORMAL
BACTERIA URNS QL MICRO: ABNORMAL /HPF
BILIRUB UR QL: NEGATIVE
COLOR UR: YELLOW
EPITH CASTS URNS QL MICRO: ABNORMAL /LPF (ref 0–5)
GLUCOSE UR STRIP.AUTO-MCNC: NEGATIVE MG/DL
HGB UR QL STRIP: ABNORMAL
KETONES UR QL STRIP.AUTO: 80 MG/DL
LEUKOCYTE ESTERASE UR QL STRIP.AUTO: ABNORMAL
NITRITE UR QL STRIP.AUTO: NEGATIVE
PH UR STRIP: 5.5 [PH] (ref 5–8)
PROT UR STRIP-MCNC: 30 MG/DL
RBC #/AREA URNS HPF: ABNORMAL /HPF (ref 0–5)
SARS-COV-2, COV2: NORMAL
SP GR UR REFRACTOMETRY: >1.03 (ref 1–1.03)
URATE CRY URNS QL MICRO: ABNORMAL
UROBILINOGEN UR QL STRIP.AUTO: 0.2 EU/DL (ref 0.2–1)
WBC URNS QL MICRO: ABNORMAL /HPF (ref 0–5)

## 2021-12-29 PROCEDURE — 96374 THER/PROPH/DIAG INJ IV PUSH: CPT

## 2021-12-29 PROCEDURE — 96375 TX/PRO/DX INJ NEW DRUG ADDON: CPT

## 2021-12-29 PROCEDURE — U0003 INFECTIOUS AGENT DETECTION BY NUCLEIC ACID (DNA OR RNA); SEVERE ACUTE RESPIRATORY SYNDROME CORONAVIRUS 2 (SARS-COV-2) (CORONAVIRUS DISEASE [COVID-19]), AMPLIFIED PROBE TECHNIQUE, MAKING USE OF HIGH THROUGHPUT TECHNOLOGIES AS DESCRIBED BY CMS-2020-01-R: HCPCS

## 2021-12-29 PROCEDURE — 74011250636 HC RX REV CODE- 250/636: Performed by: PHYSICIAN ASSISTANT

## 2021-12-29 PROCEDURE — 74011250637 HC RX REV CODE- 250/637: Performed by: PHYSICIAN ASSISTANT

## 2021-12-29 RX ORDER — IBUPROFEN 800 MG/1
800 TABLET ORAL
Qty: 20 TABLET | Refills: 0 | Status: SHIPPED | OUTPATIENT
Start: 2021-12-29 | End: 2022-01-05

## 2021-12-29 RX ORDER — ACETAMINOPHEN 500 MG
1000 TABLET ORAL
Status: COMPLETED | OUTPATIENT
Start: 2021-12-29 | End: 2021-12-29

## 2021-12-29 RX ORDER — KETOROLAC TROMETHAMINE 30 MG/ML
15 INJECTION, SOLUTION INTRAMUSCULAR; INTRAVENOUS
Status: COMPLETED | OUTPATIENT
Start: 2021-12-29 | End: 2021-12-29

## 2021-12-29 RX ORDER — ONDANSETRON 4 MG/1
4 TABLET, ORALLY DISINTEGRATING ORAL
Qty: 20 TABLET | Refills: 0 | Status: SHIPPED | OUTPATIENT
Start: 2021-12-29

## 2021-12-29 RX ADMIN — ONDANSETRON 4 MG: 2 INJECTION INTRAMUSCULAR; INTRAVENOUS at 03:07

## 2021-12-29 RX ADMIN — ACETAMINOPHEN 1000 MG: 500 TABLET ORAL at 03:07

## 2021-12-29 RX ADMIN — KETOROLAC TROMETHAMINE 15 MG: 30 INJECTION, SOLUTION INTRAMUSCULAR; INTRAVENOUS at 03:07

## 2021-12-29 RX ADMIN — SODIUM CHLORIDE 1000 ML: 900 INJECTION, SOLUTION INTRAVENOUS at 03:12

## 2021-12-29 NOTE — ED TRIAGE NOTES
Pt to ED with complaints of nausea, vomiting, HA, and bilateral lower back pain that started this afternoon and has not improved.

## 2021-12-29 NOTE — DISCHARGE INSTRUCTIONS
[employer] Activation    Thank you for requesting access to [employer]. Please follow the instructions below to securely access and download your online medical record. [employer] allows you to send messages to your doctor, view your test results, renew your prescriptions, schedule appointments, and more. How Do I Sign Up? In your internet browser, go to [email or web address]  Click on the First Time User? Click Here link in the Sign In box. You will be redirect to the New Member Sign Up page. Enter your [employer] Access Code exactly as it appears below. You will not need to use this code after youve completed the sign-up process. If you do not sign up before the expiration date, you must request a new code. [employer] Access Code: Activation code not generated  Current [employer] Status: Active (This is the date your [employer] access code will )    Enter the last four digits of your Social Security Number (xxxx) and Date of Birth (mm/dd/yyyy) as indicated and click Submit. You will be taken to the next sign-up page. Create a [employer] ID. This will be your [employer] login ID and cannot be changed, so think of one that is secure and easy to remember. Create a [employer] password. You can change your password at any time. Enter your Password Reset Question and Answer. This can be used at a later time if you forget your password. Enter your e-mail address. You will receive e-mail notification when new information is available in [address] Click Sign Up. You can now view and download portions of your medical record. Click the [place] link to download a portable copy of your medical information. Additional Information    If you have questions, please visit the Frequently Asked Questions section of the [employer] website at [email or web address] Remember, [employer] is NOT to be used for urgent needs. For medical emergencies, dial 911.

## 2021-12-29 NOTE — ED PROVIDER NOTES
EMERGENCY DEPARTMENT HISTORY AND PHYSICAL EXAM    Date: 12/28/2021  Patient Name: Tony Mansfield    History of Presenting Illness     No chief complaint on file. History Provided By:patient     Chief Complaint: flu like sx   Duration: few days  Timing: acute  Location: entire body   Quality: aching   Severity: moderate  Modifying Factors: none   Associated Symptoms: N/V, chills headaches body aches       Additional History (Context): Tony Mansfield is a 40 y.o. female with PMH optic nerve tumor  who presents with c/o flu like sx for the past few days to include chills, N/V, headaches, and body aches. Denies taking any meds for her sx PTA. No known sick exposures. Pt states she is fully COVID vaccinated. No other complaints reported at this time. PCP: George Waterman., DO    Current Facility-Administered Medications   Medication Dose Route Frequency Provider Last Rate Last Admin    ketorolac (TORADOL) injection 15 mg  15 mg IntraVENous NOW Mi Johnston PA-C        acetaminophen (TYLENOL) tablet 1,000 mg  1,000 mg Oral NOW Mi Johnston PA-C        sodium chloride 0.9 % bolus infusion 1,000 mL  1,000 mL IntraVENous ONCE Mi Johnston PA-C        ondansetron [facility]) injection 4 mg  4 mg IntraVENous NOW Mi Johnston PA-C         Current Outpatient Medications   Medication Sig Dispense Refill    ondansetron (ZOFRAN ODT) 4 mg disintegrating tablet Take 1 Tablet by mouth every eight (8) hours as needed for Nausea or Vomiting. 20 Tablet 0    ibuprofen (MOTRIN) 800 mg tablet Take 1 Tablet by mouth every six (6) hours as needed for Pain for up to 7 days. 20 Tablet 0    naproxen (NAPROSYN) 500 mg tablet Take 500 mg by mouth two (2) times daily (with meals).  azelastine (ASTELIN) 137 mcg (0.1 %) nasal spray       traZODone (DESYREL) 50 mg tablet Take 1-2 Tablets by mouth nightly. Start 1 tab at bed. After 3 days may increase to 2 tabs at bed.  60 Tablet 2    methocarbamoL (Robaxin-750) 750 mg tablet Take 2 Tablets by mouth nightly as needed for Muscle Spasm(s) or Pain. (Patient not taking: Reported on 11/15/2021) 30 Tablet 0       Past History     Past Medical History:  Past Medical History:   Diagnosis Date    Optic nerve tumor        Past Surgical History:  Past Surgical History:   Procedure Laterality Date    HX PARTIAL HYSTERECTOMY  2012       Family History:  Family History   Problem Relation Age of Onset    Cancer Maternal Grandmother        Social History:  Social History     Tobacco Use    Smoking status: Never Smoker    Smokeless tobacco: Never Used   Vaping Use    Vaping Use: Never used   Substance Use Topics    Alcohol use: Yes     Comment: occasionally    Drug use: Never       Allergies:  No Known Allergies      Review of Systems   Review of Systems   Constitutional: Positive for chills. Negative for fever. HENT: Negative. Negative for congestion, ear pain and rhinorrhea. Eyes: Negative. Negative for pain and redness. Respiratory: Negative. Negative for cough, shortness of breath, wheezing and stridor. Cardiovascular: Negative. Negative for chest pain and leg swelling. Gastrointestinal: Positive for nausea and vomiting. Negative for abdominal pain, constipation and diarrhea. Genitourinary: Negative. Negative for dysuria and frequency. Musculoskeletal: Positive for myalgias. Negative for back pain and neck pain. Skin: Negative. Negative for rash and wound. Neurological: Positive for headaches. Negative for dizziness, seizures and syncope. All other systems reviewed and are negative. All Other Systems Negative  Physical Exam     Vitals:    12/28/21 2223   BP: (!) 136/90   Pulse: (!) 112   Resp: 17   Temp: 99.4 °F (37.4 °C)   SpO2: 99%   Weight: 74.8 kg (165 lb)   Height: 5' 4\" (1.626 m)     Physical Exam  Vitals and nursing note reviewed. Constitutional:       General: She is in acute distress. Appearance: She is well-developed. She is not diaphoretic. Comments: mildly distressed   HENT:      Head: Normocephalic and atraumatic. Eyes:      General: No scleral icterus. Right eye: No discharge. Left eye: No discharge. Conjunctiva/sclera: Conjunctivae normal.   Cardiovascular:      Rate and Rhythm: Normal rate and regular rhythm. Heart sounds: Normal heart sounds. No murmur heard. No friction rub. No gallop. Pulmonary:      Effort: Pulmonary effort is normal. No respiratory distress. Breath sounds: Normal breath sounds. No stridor. No wheezing, rhonchi or rales. Abdominal:      General: Bowel sounds are normal. There is no distension. Palpations: Abdomen is soft. Tenderness: There is no abdominal tenderness. There is no guarding. Musculoskeletal:         General: Normal range of motion. Cervical back: Normal range of motion and neck supple. Skin:     General: Skin is warm and dry. Findings: No erythema or rash. Neurological:      Mental Status: She is alert and oriented to person, place, and time. Coordination: Coordination normal.      Comments: Gait is steady and patient exhibits no evidence of ataxia. Patient is able to ambulate without difficulty. No focal neurological deficit noted. No facial droop, slurred speech, or evidence of altered mentation noted on exam.     Psychiatric:         Behavior: Behavior normal.         Thought Content:  Thought content normal.                Diagnostic Study Results     Labs -     Recent Results (from the past 12 hour(s))   URINALYSIS W/ RFLX MICROSCOPIC    Collection Time: 12/28/21 10:25 PM   Result Value Ref Range    Color YELLOW      Appearance CLOUDY      Specific gravity >1.030 (H) 1.005 - 1.030    pH (UA) 5.5 5.0 - 8.0      Protein 30 (A) NEG mg/dL    Glucose Negative NEG mg/dL    Ketone 80 (A) NEG mg/dL    Bilirubin Negative NEG      Blood SMALL (A) NEG      Urobilinogen 0.2 0.2 - 1.0 EU/dL    Nitrites Negative NEG Leukocyte Esterase TRACE (A) NEG     URINE MICROSCOPIC ONLY    Collection Time: 12/28/21 10:25 PM   Result Value Ref Range    WBC 2 to 4 0 - 5 /hpf    RBC 0 to 2 0 - 5 /hpf    Epithelial cells 3+ 0 - 5 /lpf    Bacteria 3+ (A) NEG /hpf    Uric acid crystals FEW (A) NEG     CBC WITH AUTOMATED DIFF    Collection Time: 12/28/21 10:30 PM   Result Value Ref Range    WBC 7.4 4.6 - 13.2 K/uL    RBC 4.97 4.20 - 5.30 M/uL    HGB 13.5 12.0 - 16.0 g/dL    HCT 41.4 35.0 - 45.0 %    MCV 83.3 78.0 - 100.0 FL    MCH 27.2 24.0 - 34.0 PG    MCHC 32.6 31.0 - 37.0 g/dL    RDW 13.8 11.6 - 14.5 %    PLATELET 256 632 - 241 K/uL    MPV 9.6 9.2 - 11.8 FL    NRBC 0.0 0  WBC    ABSOLUTE NRBC 0.00 0.00 - 0.01 K/uL    NEUTROPHILS 92 (H) 40 - 73 %    LYMPHOCYTES 2 (L) 21 - 52 %    MONOCYTES 5 3 - 10 %    EOSINOPHILS 0 0 - 5 %    BASOPHILS 0 0 - 2 %    IMMATURE GRANULOCYTES 0 0.0 - 0.5 %    ABS. NEUTROPHILS 6.8 1.8 - 8.0 K/UL    ABS. LYMPHOCYTES 0.2 (L) 0.9 - 3.6 K/UL    ABS. MONOCYTES 0.4 0.05 - 1.2 K/UL    ABS. EOSINOPHILS 0.0 0.0 - 0.4 K/UL    ABS. BASOPHILS 0.0 0.0 - 0.1 K/UL    ABS. IMM. GRANS. 0.0 0.00 - 0.04 K/UL    DF AUTOMATED     METABOLIC PANEL, COMPREHENSIVE    Collection Time: 12/28/21 10:30 PM   Result Value Ref Range    Sodium 135 (L) 136 - 145 mmol/L    Potassium 3.6 3.5 - 5.5 mmol/L    Chloride 104 100 - 111 mmol/L    CO2 26 21 - 32 mmol/L    Anion gap 5 3.0 - 18 mmol/L    Glucose 124 (H) 74 - 99 mg/dL    BUN 10 7.0 - 18 MG/DL    Creatinine 0.89 0.6 - 1.3 MG/DL    BUN/Creatinine ratio 11 (L) 12 - 20      GFR est AA >60 >60 ml/min/1.73m2    GFR est non-AA >60 >60 ml/min/1.73m2    Calcium 9.5 8.5 - 10.1 MG/DL    Bilirubin, total 0.3 0.2 - 1.0 MG/DL    ALT (SGPT) 17 13 - 56 U/L    AST (SGOT) 18 10 - 38 U/L    Alk.  phosphatase 97 45 - 117 U/L    Protein, total 8.4 (H) 6.4 - 8.2 g/dL    Albumin 3.9 3.4 - 5.0 g/dL    Globulin 4.5 (H) 2.0 - 4.0 g/dL    A-G Ratio 0.9 0.8 - 1.7     HCG QL SERUM    Collection Time: 12/28/21 10:30 PM Result Value Ref Range    HCG, Ql. Negative NEG     LIPASE    Collection Time: 12/28/21 10:30 PM   Result Value Ref Range    Lipase 88 73 - 393 U/L       Radiologic Studies -   No orders to display     CT Results  (Last 48 hours)    None        CXR Results  (Last 48 hours)    None            Medical Decision Making   I am the first provider for this patient. I reviewed the vital signs, available nursing notes, past medical history, past surgical history, family history and social history. Vital Signs-Reviewed the patient's vital signs. Records Reviewed: Mi Johnston PA-C     Procedures:  Procedures    Provider Notes (Medical Decision Making): Impression:  Suspected COVID, dehydration     Labs: CBC unremarkable. Na 135, UA nto convincing of UTI, sent for culture, hcg negative   covid test sent out    Pt treated with IV fluids zofran toradol and tylenol in the ED. Will plan to d/c with self quarantine instructions and pcp follow-up. Return precautions advised. Pt agrees. Mi Johnston PA-C     MED RECONCILIATION:  Current Facility-Administered Medications   Medication Dose Route Frequency    ketorolac (TORADOL) injection 15 mg  15 mg IntraVENous NOW    acetaminophen (TYLENOL) tablet 1,000 mg  1,000 mg Oral NOW    sodium chloride 0.9 % bolus infusion 1,000 mL  1,000 mL IntraVENous ONCE    ondansetron (ZOFRAN) injection 4 mg  4 mg IntraVENous NOW     Current Outpatient Medications   Medication Sig    ondansetron (ZOFRAN ODT) 4 mg disintegrating tablet Take 1 Tablet by mouth every eight (8) hours as needed for Nausea or Vomiting.  ibuprofen (MOTRIN) 800 mg tablet Take 1 Tablet by mouth every six (6) hours as needed for Pain for up to 7 days.  naproxen (NAPROSYN) 500 mg tablet Take 500 mg by mouth two (2) times daily (with meals).  azelastine (ASTELIN) 137 mcg (0.1 %) nasal spray     traZODone (DESYREL) 50 mg tablet Take 1-2 Tablets by mouth nightly. Start 1 tab at bed.   After 3 days may increase to 2 tabs at bed.  methocarbamoL (Robaxin-750) 750 mg tablet Take 2 Tablets by mouth nightly as needed for Muscle Spasm(s) or Pain. (Patient not taking: Reported on 11/15/2021)       Disposition:  D/c    DISCHARGE NOTE:   Patient is stable for discharge at this time. I have discussed all the findings from today's work up with the patient, including lab results and imaging. I have answered all questions. Rx for zofran and motrin given. Rest and close follow-up with the PCP recommended this week. Return to the ED immediately for any new or worsening symptoms. April Lisa Madrigal PA-C     Follow-up Information     Follow up With Specialties Details Why Contact Info    Page Flatness., DO Family Medicine In 1 week  [address] [place] [facility] EMERGENCY DEPT Emergency Medicine  As needed, If symptoms worsen [address] [facility] [address]  [phone]          Current Discharge Medication List      START taking these medications    Details   ondansetron (ZOFRAN ODT) 4 mg disintegrating tablet Take 1 Tablet by mouth every eight (8) hours as needed for Nausea or Vomiting. Qty: 20 Tablet, Refills: 0  Start date: 12/29/2021      ibuprofen (MOTRIN) 800 mg tablet Take 1 Tablet by mouth every six (6) hours as needed for Pain for up to 7 days. Qty: 20 Tablet, Refills: 0  Start date: 12/29/2021, End date: 1/5/2022                 Diagnosis     Clinical Impression:   1. Suspected COVID-19 virus infection    2.  Dehydration

## 2021-12-30 ENCOUNTER — PATIENT OUTREACH (OUTPATIENT)
Dept: CASE MANAGEMENT | Age: 46
End: 2021-12-30

## 2021-12-30 LAB — SARS-COV-2, NAA: DETECTED

## 2022-01-03 ENCOUNTER — PATIENT OUTREACH (OUTPATIENT)
Dept: CASE MANAGEMENT | Age: 47
End: 2022-01-03

## 2022-01-03 ENCOUNTER — APPOINTMENT (OUTPATIENT)
Dept: PHYSICAL THERAPY | Age: 47
End: 2022-01-03
Attending: FAMILY MEDICINE
Payer: MEDICAID

## 2022-01-03 NOTE — PROGRESS NOTES
Contacted patient for Transitions of Care Coordination  follow up. No answer. Left message introducing self, role and reason for call. Requested return call. Contact information provided. Second unsuccessful attempt to contact patient. Episode resolved.

## 2022-01-05 ENCOUNTER — APPOINTMENT (OUTPATIENT)
Dept: PHYSICAL THERAPY | Age: 47
End: 2022-01-05
Attending: FAMILY MEDICINE
Payer: MEDICAID

## 2022-01-10 ENCOUNTER — APPOINTMENT (OUTPATIENT)
Dept: PHYSICAL THERAPY | Age: 47
End: 2022-01-10
Attending: FAMILY MEDICINE
Payer: MEDICAID

## 2022-01-12 ENCOUNTER — APPOINTMENT (OUTPATIENT)
Dept: PHYSICAL THERAPY | Age: 47
End: 2022-01-12
Attending: FAMILY MEDICINE
Payer: MEDICAID

## 2022-01-17 ENCOUNTER — APPOINTMENT (OUTPATIENT)
Dept: PHYSICAL THERAPY | Age: 47
End: 2022-01-17
Attending: FAMILY MEDICINE
Payer: MEDICAID

## 2022-01-18 ENCOUNTER — HOSPITAL ENCOUNTER (OUTPATIENT)
Dept: PHYSICAL THERAPY | Age: 47
Discharge: HOME | End: 2022-01-18
Attending: FAMILY MEDICINE
Payer: MEDICAID

## 2022-01-18 PROCEDURE — 97110 THERAPEUTIC EXERCISES: CPT

## 2022-01-18 PROCEDURE — 97530 THERAPEUTIC ACTIVITIES: CPT

## 2022-01-18 PROCEDURE — 97112 NEUROMUSCULAR REEDUCATION: CPT

## 2022-01-18 NOTE — PROGRESS NOTES
PT DAILY TREATMENT NOTE 10-18    Patient Name: Yashira Peraza  Date:2022  : 1975  [x]  Patient  Verified  Payor: Erik Kanner / Plan: [facility] MEDICAID / Product Type: Managed Care Medicaid /    In EOVT:[id number]  Out time:1113  Total Treatment Time (min): 41  Visit #: 1 of 4-8  Treatment Area: Post concussion syndrome [F07.81]    SUBJECTIVE  Pain Level (0-10 scale): 3  Any medication changes, allergies to medications, adverse drug reactions, diagnosis change, or new procedure performed?: [x] No    [] Yes (see summary sheet for update)  Subjective functional status/changes:   [] No changes reported  I'm doing much better. I got Covid, so that threw things off. 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 OBJECTIVE    10 min Therapeutic Exercise:  [] See flow sheet :   Rationale: increase ROM, increase strength, improve coordination, improve balance and increase proprioception to improve the patients ability to perform daily activities      15 min Neuromuscular Re-education:  []  See flow sheet :   Rationale: improve coordination, improve balance and increase proprioception  to improve the patients ability to ambulate community distances without LOB or dizziness  16 min Therapeutic Activity:  []  See flow sheet :   Rationale: improve coordination, improve balance and increase proprioception  to improve the patients ability to perform ADLs    With   [] TE   [] TA   [] neuro   [] other: Patient Education: [x] Review HEP    [] Progressed/Changed HEP based on:   [] positioning   [] body mechanics   [] transfers   [] heat/ice application    [] other:      Other Objective/Functional Measures:      Pain Level (0-10 scale) post treatment: 2    ASSESSMENT/Changes in Function: Patient presents back to therapy after 3 week break due to Covid. Overall, patient is improving, as her CSI continues to decrease with each visit. Added VOR 2 and VOR cancel today. Able to perform without provocation of symptoms. Reports only having 2 mild HAs/week. Patient will continue to benefit from skilled PT services to modify and progress therapeutic interventions, analyze and cue movement patterns and address imbalance/dizziness to attain remaining goals. []  See Plan of Care  []  See progress note/recertification  []  See Discharge Summary         Progress towards goals / Updated goals:  1. Improve FOTO to 59 to indicate improved function with daily activities.   PN: progressing, FOTO = 53   2. Decrease CSI to <=10 to indicate improved function for work tasks. PN: progressing, CSI = 77    3. Improve convergence to <= 6 cm to increase ease with reading.   PN: progressing, 16cm  Current: 16 cm no change  4. 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   PN: progressing, reports 3-4 HAs a week but states intensity is less and duration is less   Current:2 HAs a week-progressing 1/18/22  PLAN  [x]  Upgrade activities as tolerated     []  Continue plan of care  []  Update interventions per flow sheet       []  Discharge due to:_  []  Other:_      JUDITH Paige, CMTPT 1/18/2022  8:55 AM    Future Appointments   Date Time Provider Shahid Mora   1/18/2022 10:30 AM [facility]   1/20/2022  8:30 AM Marcia Hackett, PT [facility]   1/24/2022 12:30 PM [facility]   1/26/2022  8:45 AM [facility]

## 2022-01-19 ENCOUNTER — APPOINTMENT (OUTPATIENT)
Dept: PHYSICAL THERAPY | Age: 47
End: 2022-01-19
Attending: FAMILY MEDICINE
Payer: MEDICAID

## 2022-01-20 ENCOUNTER — HOSPITAL ENCOUNTER (OUTPATIENT)
Dept: PHYSICAL THERAPY | Age: 47
Discharge: HOME | End: 2022-01-20
Attending: FAMILY MEDICINE
Payer: MEDICAID

## 2022-01-20 PROCEDURE — 97116 GAIT TRAINING THERAPY: CPT

## 2022-01-20 PROCEDURE — 97530 THERAPEUTIC ACTIVITIES: CPT

## 2022-01-20 PROCEDURE — 97112 NEUROMUSCULAR REEDUCATION: CPT

## 2022-01-20 NOTE — PROGRESS NOTES
[facility]  [address]  [place], [address]  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 Improve FOTO to 59 to indicate improved function with daily activities. PN: progressing, FOTO = 53    Current: remains, 50   2. Decrease CSI to <=10 to indicate improved function for work tasks. PN: progressing, CSI = 77    Current: progressing, 67   3. Improve convergence to <= 6 cm to increase ease with reading. PN: progressing, 16cm  Current: 16 cm no change  4. 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 PN: progressing, reports 3-4 HAs a week but states intensity is less and duration is less   Current: 2 HAs a week-progressing     Key Functional Changes: CSI 67    Updated Goals: to be achieved in 4 weeks:  1. Improve FOTO to 59 to indicate improved function with daily activities. PN: remains, 50   2. Decrease CSI to <=10 to indicate improved function for work tasks. PN: progressing, 67   3. Improve convergence to <= 6 cm to increase ease with reading. 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     PN: 2 HAs a week-progressing     ASSESSMENT/RECOMMENDATIONS:  Pt is demonstrating improved Concussion Symptom Index scores from evaluation (119) and progress note (77), scoring a 67 today. Her FOTO score remains similar to her last progress note as functionally she is still limited. Limitations persist with gait, bumping into people in grocery stores, with focus and reading retention with job duties, and with headaches. She also c/o some right sided neck pain following therapy today. As Ms. Bobbi Alvarado is demonstrating improvement towards goal attainment, it is recommended that she continue with skilled PT services at a frequency of 2x/week for the next 4 weeks. Patient will continue to benefit from skilled PT services to modify and progress therapeutic interventions, address functional mobility deficits, address ROM deficits, address strength deficits, analyze and address soft tissue restrictions, analyze and cue movement patterns, analyze and modify body mechanics/ergonomics, assess and modify postural abnormalities, address imbalance/dizziness and instruct in home and community integration to attain remaining goals. [x]Continue therapy per initial plan/protocol at a frequency of  2 x per week for 4 weeks  []Continue therapy with the following recommended changes:_____________________      _____________________________________________________________________  []Discontinue therapy progressing towards or have reached established goals  []Discontinue therapy due to lack of appreciable progress towards goals  []Discontinue therapy due to lack of attendance or compliance  []Await Physician's recommendations/decisions regarding therapy  []Other:________________________________________________________________    Thank you for this referral.    Irvin Ambriz, PT 1/20/2022 9:24 AM  NOTE TO PHYSICIAN:  Geeta Rizzo [address]   [facility]: [phone]  If you are unable to process this request in 24 hours please contact our office: [phone]    [x]  I have read the above report and request that my patient continue as recommended.   I have read the above report and request that my patient continue therapy with the following changes/special instructions:__________________________________________________________  I have read the above report and request that my patient be discharged from therapy.     Physicians signature: ______________________________Date: ______Time:______     Aubrey Speaker, DO

## 2022-01-20 NOTE — PROGRESS NOTES
PT DAILY TREATMENT NOTE     Patient Name: Mar Horta  Date:2022  : 1975  [x]  Patient  Verified  Payor: Alexandre Desouza / Plan: [id number] [employer] / Product Type: Managed Care Medicaid /    In time:832am  Out time:912am  Total Treatment Time (min): 40  Visit #: 2 of 4-8     Treatment Area: Post concussion syndrome [F07.81]    SUBJECTIVE  Pain Level (0-10 scale): 4  Any medication changes, allergies to medications, adverse drug reactions, diagnosis change, or new procedure performed?: [x] No    [] Yes (see summary sheet for update)  Subjective functional status/changes:   [] No changes reported  Some improved processing and retaining of information with reading. Headache today from working yesterday. OBJECTIVE  20 min Therapeutic Activity:  [x]? See flow sheet : saccades, smooth pursuits, pencil pushes   Rationale: improve coordination  to improve the patients ability to read and write with improved focus. 10 min Neuromuscular Re-education:  [x]? See flow sheet :   Rationale: increase strength, improve coordination, improve balance and increase proprioception  to improve the patients ability to ambulate with improved stability     10 min Gait Training: Dynamic gait per flow sheet   Rationale: improve balance and coordination for greater ease w/ grocery shopping          With   [] TE   [] TA   [] neuro   [] other: Patient Education: [x] Review HEP    [] Progressed/Changed HEP based on:   [] positioning   [] body mechanics   [] transfers   [] heat/ice application    [] other:      Other Objective/Functional Measures: see PN     Pain Level (0-10 scale) post treatment: 4    ASSESSMENT/Changes in Function: Pt is demonstrating improved Concussion Symptom Index scores from evaluation (119) and progress note (77), scoring a 67 today. Her FOTO score remains similar to her last progress note as functionally she is still limited.  Limitations persist with gait, bumping into people in grocery [employer], with focus and reading retention with job duties, and with headaches. She also c/o some right sided neck pain following therapy today. As Ms. Rhett Bliss is demonstrating improvement towards goal attainment, it is recommended that she continue with skilled PT services at a frequency of 2x/week for the next 4 weeks. Patient will continue to benefit from skilled PT services to modify and progress therapeutic interventions, address functional mobility deficits, address ROM deficits, address strength deficits, analyze and address soft tissue restrictions, analyze and cue movement patterns, analyze and modify body mechanics/ergonomics, assess and modify postural abnormalities, address imbalance/dizziness and instruct in home and community integration to attain remaining goals. []  See Plan of Care  [x]  See progress note/recertification  []  See Discharge Summary         Progress towards goals / Updated goals:  1. Improve FOTO IF 33 VK indicate improved function with daily activities.   PN: progressing, FOTO = 53    Current: remains, 50 (1/20/2022)  2. Decrease CSI to <=10 to indicate improved function for work tasks. PN: progressing, CSI = 77    Current: progressing, 67 (1/20/2022)  3. Improve convergence to <= 6 cm to increase ease with reading.   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     PN: progressing, reports 3-4 HAs a week but states intensity is less and duration is less   Current: 2 HAs a week-progressing 1/18/2022    PLAN  []  Upgrade activities as tolerated     [x]  Continue plan of care   []  Update interventions per flow sheet       []  Discharge due to:_  []  Other:_      Jm Sanchez, PT 1/20/2022  8:35 AM    Future Appointments   Date Time Provider Shahid Mora   1/24/2022 12:30 PM [facility]   1/26/2022  8:45 AM [facility]

## 2022-01-24 ENCOUNTER — HOSPITAL ENCOUNTER (OUTPATIENT)
Dept: PHYSICAL THERAPY | Age: 47
Discharge: HOME | End: 2022-01-24
Attending: FAMILY MEDICINE
Payer: MEDICAID

## 2022-01-24 PROCEDURE — 97112 NEUROMUSCULAR REEDUCATION: CPT | Performed by: PHYSICAL THERAPIST

## 2022-01-24 PROCEDURE — 97116 GAIT TRAINING THERAPY: CPT | Performed by: PHYSICAL THERAPIST

## 2022-01-24 PROCEDURE — 97530 THERAPEUTIC ACTIVITIES: CPT | Performed by: PHYSICAL THERAPIST

## 2022-01-24 NOTE — PROGRESS NOTES
PT DAILY TREATMENT NOTE     Patient Name: Vitaly Keene  Date:2022  : 1975  [x]  Patient  Verified  Payor: Annie Chapman / Plan: Satish Lynn / Product Type: Managed Care Medicaid /    In time:1235  Out time:130  Total Treatment Time (min): 55  Visit #: 1 of 8    Treatment Area: Post concussion syndrome [F07.81]    SUBJECTIVE  Pain Level (0-10 scale): 4/10  Any medication changes, allergies to medications, adverse drug reactions, diagnosis change, or new procedure performed?: [x] No    [] Yes (see summary sheet for update)  Subjective functional status/changes:   [] No changes reported  Pt reports she woke up with a headache and she's having more light sensitivity now as well. OBJECTIVE    19 min Therapeutic Activity:  [x]  See flow sheet : increased per flow sheet   Rationale: improve coordination and improve balance  to improve the patients ability to decrease concussion symptoms     19 min Neuromuscular Re-education:  [x]  See flow sheet : progressed per flow sheet   Rationale: improve coordination, improve balance and increase proprioception  to improve the patients ability to improve fall risk and pain    7 min Manual Therapy:  Supine: SOR, TPR/DTM to B UT, LS, scalenes, c/s paraspinals, c/s PA's and right down glides grade 3, manual str to B UT and right LS   The manual therapy interventions were performed at a separate and distinct time from the therapeutic activities interventions.   Rationale: decrease pain, increase ROM, increase tissue extensibility, decrease trigger points and increase postural awareness to improve HA's and activity tolerance     10 min Gait Training: Dynamic gait per flow sheet   Rationale:improve balance and coordination for greater ease w/ grocery shopping          With   [] TE   [] TA   [] neuro   [] other: Patient Education: [x] Review HEP    [] Progressed/Changed HEP based on:   [] positioning   [] body mechanics   [] transfers   [] heat/ice application    [] other:      Other Objective/Functional Measures: CSI = 63     Pain Level (0-10 scale) post treatment: 3/10    ASSESSMENT/Changes in Function: Pt making progress and improving activity tolerance. Resumed manual therapy 2' elevated headache and report of clicking in the neck with head turn activities. Improved light sensitivity and HA following session. Reviewed UT and LS stretched for HEP and encouraged to increase frequency of VOR cancellation ex's at home. Patient will continue to benefit from skilled PT services to modify and progress therapeutic interventions, address ROM deficits, analyze and address soft tissue restrictions, analyze and cue movement patterns, address imbalance/dizziness and instruct in home and community integration to attain remaining goals. []  See Plan of Care  []  See progress note/recertification  []  See Discharge Summary         Progress towards goals / Updated goals:  Updated Goals: to be achieved in 4 weeks:  1. Improve FOTO to 59 to indicate improved function with daily activities.   PN: remains, 50   2. Decrease CSI to <=10 to indicate improved function for work tasks. PN: progressing, 67   Current: progressing, CSI = 63 1/24/22  3. Improve convergence to <= 6 cm to increase ease with reading.   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    PN: 2 HAs a week-progressing     PLAN  [x]  Upgrade activities as tolerated     []  Continue plan of care  []  Update interventions per flow sheet       []  Discharge due to:_  []  Other:_      Tina Chou, DPT 1/24/2022  1:05 PM    Future Appointments   Date Time Provider Shahid Mora   1/26/2022  8:45 AM Charles Garcia DPT [facility]   1/31/2022  7:00 AM Karen eKys, PT [facility]   2/2/2022  8:45 AM Charles Garcia DPT [facility]   2/7/2022  8:30 AM Karen Keys, PT [facility]   2/9/2022  8:45 AM Charles Garcia DPT [facility]   2/14/2022  8:30 AM Odilon Espinoza, PT [facility]   2/18/2022  9:45 AM Prince Silverman, PT [facility]

## 2022-01-26 ENCOUNTER — HOSPITAL ENCOUNTER (OUTPATIENT)
Dept: PHYSICAL THERAPY | Age: 47
Discharge: HOME | End: 2022-01-26
Attending: FAMILY MEDICINE
Payer: MEDICAID

## 2022-01-26 PROCEDURE — 97530 THERAPEUTIC ACTIVITIES: CPT | Performed by: PHYSICAL THERAPIST

## 2022-01-26 PROCEDURE — 97116 GAIT TRAINING THERAPY: CPT | Performed by: PHYSICAL THERAPIST

## 2022-01-26 PROCEDURE — 97112 NEUROMUSCULAR REEDUCATION: CPT | Performed by: PHYSICAL THERAPIST

## 2022-01-26 NOTE — PROGRESS NOTES
PT DAILY TREATMENT NOTE     Patient Name: Casper Moment  Date:2022  : 1975  [x]  Patient  Verified  Payor: Lizzeth Lucy / Plan: [id number] / Product Type: Managed Care Medicaid /    In time:855  Out time:945  Total Treatment Time (min): 50  Visit #: 2 of 8      Treatment Area: Post concussion syndrome [F07.81]    SUBJECTIVE  Pain Level (0-10 scale): 3/10  Any medication changes, allergies to medications, adverse drug reactions, diagnosis change, or new procedure performed?: [x] No    [] Yes (see summary sheet for update)  Subjective functional status/changes:   [] No changes reported  Pt reports she is experiencing elevated stress and anxiety as a result of the concussion, more so yesterday than today. OBJECTIVE    20 min Therapeutic Activity:  [x]  See flow sheet :   Rationale: increase strength and improve coordination  to improve the patients ability to improve headaches and visual disturbances     20 min Neuromuscular Re-education:  [x]  See flow sheet : resumed and increased balance and vestibular ex's per flow sheet   Rationale: improve coordination, improve balance and increase proprioception  to improve the patients ability to improve activity tolerance and driving tolerance    10 min Gait Training:  Dynamic gait per flow sheet   Rationale:improve balance and coordination for greater ease w/ grocery shopping          With   [] TE   [] TA   [] neuro   [] other: Patient Education: [x] Review HEP    [] Progressed/Changed HEP based on:   [] positioning   [] body mechanics   [] transfers   [] heat/ice application    [] other:      Other Objective/Functional Measures: CSI = 63     Pain Level (0-10 scale) post treatment: 3/10    ASSESSMENT/Changes in Function: Pt w/ multiple self corrected LOBs during EC tandem. Improving MSR EC. Continue to progress as tolerated.      Patient will continue to benefit from skilled PT services to modify and progress therapeutic interventions, address functional mobility deficits, analyze and address soft tissue restrictions, analyze and modify body mechanics/ergonomics, address imbalance/dizziness and instruct in home and community integration to attain remaining goals. []  See Plan of Care  []  See progress note/recertification  []  See Discharge Summary         Progress towards goals / Updated goals:  Updated Goals: to be achieved in 4 weeks:  1. Improve FOTO to 59 to indicate improved function with daily activities.   PN: remains, 50   2. Decrease CSI to <=10 to indicate improved function for work tasks. PN: progressing, 67   Current: progressing, CSI = 63 1/24/22  3. Improve convergence to <= 6 cm to increase ease with reading.   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    PN: 2 HAs a week-progressing     PLAN  [x]  Upgrade activities as tolerated     []  Continue plan of care  []  Update interventions per flow sheet       []  Discharge due to:_  []  Other:_      Jenna Andino DPT 1/26/2022  8:57 AM    Future Appointments   Date Time Provider Shahid Mora   1/31/2022  7:00 AM Marcia Appl, PT [facility]   2/2/2022  8:45 AM Wilmar Nelson, JOVANAT [facility]   2/7/2022  8:30 AM Marcia Appl, PT [facility]   2/9/2022  8:45 AM Wilmar Nelson, DPT [facility]   2/14/2022  8:30 AM Marcia Appl, PT [facility]   2/18/2022  9:45 AM Johanny Peck, PT [facility]

## 2022-01-31 ENCOUNTER — APPOINTMENT (OUTPATIENT)
Dept: PHYSICAL THERAPY | Age: 47
End: 2022-01-31
Attending: FAMILY MEDICINE
Payer: MEDICAID

## 2022-02-02 ENCOUNTER — HOSPITAL ENCOUNTER (OUTPATIENT)
Dept: PHYSICAL THERAPY | Age: 47
Discharge: HOME | End: 2022-02-02
Attending: FAMILY MEDICINE
Payer: MEDICAID

## 2022-02-02 PROCEDURE — 97112 NEUROMUSCULAR REEDUCATION: CPT | Performed by: PHYSICAL THERAPIST

## 2022-02-02 PROCEDURE — 97530 THERAPEUTIC ACTIVITIES: CPT | Performed by: PHYSICAL THERAPIST

## 2022-02-02 PROCEDURE — 97116 GAIT TRAINING THERAPY: CPT | Performed by: PHYSICAL THERAPIST

## 2022-02-02 NOTE — PROGRESS NOTES
PT DAILY TREATMENT NOTE     Patient Name: Nathaniel Ruth  Date:2022  : 1975  [x]  Patient  Verified  Payor: Amada Cox / Plan: [id number] [employer] / Product Type: Managed Care Medicaid /    In time:855  Out time:943  Total Treatment Time (min): 48  Visit #: 3 of 8      Treatment Area: Post concussion syndrome [F07.81]    SUBJECTIVE  Pain Level (0-10 scale): 610  Any medication changes, allergies to medications, adverse drug reactions, diagnosis change, or new procedure performed?: [x] No    [] Yes (see summary sheet for update)  Subjective functional status/changes:   [] No changes reported  Pt reports she is feeling very overwhelmed and out of sorts today. Reports she is still having difficulty remembering things especially her appointment times. Reports she knew today was at 5 and verbalized it to numerous people and herself but still thought it was at 9 when she started on her way over here. Reports she's done a lot this week already and feels it.      OBJECTIVE    19 min Therapeutic Activity:  [x] See flow sheet :   Rationale: increase ROM, increase strength and improve coordination to improve the patients ability to decrease concussion symptoms     19 min Neuromuscular Re-education:  [x]  See flow sheet : resumed and increased balance and vestibular ex's per flow sheet   Rationale: increase strength, improve coordination, improve balance and increase proprioception  to improve the patients ability to improve concussion symptoms      10 min Gait Training:  Dynamic gait per flow sheet   Rationale:improve balance and coordination for greater ease w/ grocery shopping          With   [] TE   [] TA   [] neuro   [] other: Patient Education: [x] Review HEP    [] Progressed/Changed HEP based on:   [] positioning   [] body mechanics   [] transfers   [] heat/ice application    [] other:      Other Objective/Functional Measures: CSI = 70     Pain Level (0-10 scale) post treatment: 4-5/10    ASSESSMENT/Changes in Function: Pt's CSI score regressed to day due to elevated activity levels this week. Reviewed stress relieving breathing techniques and discussed vision therapy and will send message to Dr. Candis Obrien for thoughts. Frequent VC's to keep visual target in irritable field of vision to improve tolerance and vision disturbances. Pt demo'd and verbalized understanding. Patient will continue to benefit from skilled PT services to modify and progress therapeutic interventions, address functional mobility deficits, analyze and cue movement patterns, address imbalance/dizziness and instruct in home and community integration to attain remaining goals. []  See Plan of Care  []  See progress note/recertification  []  See Discharge Summary         Progress towards goals / Updated goals:  Updated Goals: to be achieved in 4 weeks:  1. Improve FOTO to 59 to indicate improved function with daily activities.   PN: remains, 50   2. Decrease CSI to <=10 to indicate improved function for work tasks. PN: progressing, 67   Current: progressing, CSI = 63 1/24/22  3. Improve convergence to <= 6 cm to increase ease with reading.   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    PN: 2 HAs a week-progressing  Current: progressing, 1 very slight headache this week so far 2/2/22     PLAN  [x]  Upgrade activities as tolerated     []  Continue plan of care  []  Update interventions per flow sheet       []  Discharge due to:_  []  Other:_      Ashley Moore, JOVANAT 2/2/2022  8:58 AM    Future Appointments   Date Time Provider Shahid Mora   2/4/2022  2:15 PM Chel Harris, PT [facility]   2/7/2022  8:30 AM Chel Harris, PT [facility]   2/9/2022  8:45 AM Donovan Espitia DPT [facility]   2/14/2022  8:30 AM Chel Harris, PT [facility]   2/18/2022  9:45 AM Donnie Cedillo, PT [facility]

## 2022-02-04 ENCOUNTER — HOSPITAL ENCOUNTER (OUTPATIENT)
Dept: PHYSICAL THERAPY | Age: 47
Discharge: HOME | End: 2022-02-04
Attending: FAMILY MEDICINE
Payer: MEDICAID

## 2022-02-04 PROCEDURE — 97116 GAIT TRAINING THERAPY: CPT

## 2022-02-04 PROCEDURE — 97112 NEUROMUSCULAR REEDUCATION: CPT

## 2022-02-04 PROCEDURE — 97530 THERAPEUTIC ACTIVITIES: CPT

## 2022-02-04 NOTE — PROGRESS NOTES
PT DAILY TREATMENT NOTE     Patient Name: Rachel Galicia  JRQH:  : 1975  [x]  Patient  Verified  Payor: Lorenza Garnettar / Plan: [id number] / Product Type: Managed Care Medicaid /    In time:219pm  Out time:3pm  Total Treatment Time (min): 41  Visit #: 4 of 8     Treatment Area: Post concussion syndrome [F07.81]    SUBJECTIVE  Pain Level (0-10 scale): 3-4  Any medication changes, allergies to medications, adverse drug reactions, diagnosis change, or new procedure performed?: [x] No    [] Yes (see summary sheet for update)  Subjective functional status/changes:   [] No changes reported  Reports slight headache today over left frontal/temporal lobe. OBJECTIVE  15 min Therapeutic Activity:  [x]? See flow sheet :   Rationale: increase ROM, increase strength and improve coordination to improve the patients ability to decrease concussion symptoms      18 min Neuromuscular Re-education:  [x]? See flow sheet : resumed and increased balance and vestibular ex's per flow sheet   Rationale: increase strength, improve coordination, improve balance and increase proprioception  to improve the patients ability to improve concussion symptoms      8 min Gait Training:  Dynamic gait per flow sheet   Rationale:improve balance and coordination for greater ease w/ grocery shopping          With   [] TE   [] TA   [] neuro   [] other: Patient Education: [x] Review HEP    [] Progressed/Changed HEP based on:   [] positioning   [] body mechanics   [] transfers   [] heat/ice application    [] other:      Other Objective/Functional Measures: exercises per flowsheet     Pain Level (0-10 scale) post treatment: 3-4    ASSESSMENT/Changes in Function: Pt has headache behind left eye with smooth pursuits and saccades, between left eye and ear with VOR tasks, and at the forehead with convergence activities. Is able to complete all activities with short breaks between exercises.  Good reports of no increased symptoms following tx. Patient will continue to benefit from skilled PT services to modify and progress therapeutic interventions, address functional mobility deficits, address ROM deficits, address strength deficits, analyze and address soft tissue restrictions, analyze and cue movement patterns, analyze and modify body mechanics/ergonomics, assess and modify postural abnormalities, address imbalance/dizziness and instruct in home and community integration to attain remaining goals. []  See Plan of Care  []  See progress note/recertification  []  See Discharge Summary         Progress towards goals / Updated goals:  1. Improve FOTO to 59 to indicate improved function with daily activities.   PN: remains, 50   2. Decrease CSI to <=10 to indicate improved function for work tasks. PN: progressing, 67   Current: progressing, CSI = 63 1/24/22  3. Improve convergence to <= 6 cm to increase ease with reading.   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    PN: 2 HAs a week-progressing  Current: progressing, 1 very slight headache this week so far 2/2/22     PLAN  []  Upgrade activities as tolerated     [x]  Continue plan of care  []  Update interventions per flow sheet       []  Discharge due to:_  []  Other:_      Irvin Ambriz, PT 2/4/2022  2:35 PM    Future Appointments   Date Time Provider Shahid Mora   2/7/2022  8:30 AM Deidre Trejo, PT [facility]   2/9/2022  8:45 AM Edwige Marroquin DPT [facility]   2/14/2022  8:30 AM Deidre Trejo PT [facility]   2/18/2022  9:45 AM Chidi Becerra, PT [facility]

## 2022-02-07 ENCOUNTER — HOSPITAL ENCOUNTER (OUTPATIENT)
Dept: PHYSICAL THERAPY | Age: 47
Discharge: HOME | End: 2022-02-07
Attending: FAMILY MEDICINE
Payer: MEDICAID

## 2022-02-07 PROCEDURE — 97112 NEUROMUSCULAR REEDUCATION: CPT

## 2022-02-07 PROCEDURE — 97530 THERAPEUTIC ACTIVITIES: CPT

## 2022-02-07 PROCEDURE — 97116 GAIT TRAINING THERAPY: CPT

## 2022-02-07 NOTE — PROGRESS NOTES
PT DAILY TREATMENT NOTE     Patient Name: Chun Germain  ZRRL:[id number]  : 1975  [x]  Patient  Verified  Payor: Dalton Palma / Plan: [employer] / Product Type: Managed Care Medicaid /    In time:834am  Out time:915am  Total Treatment Time (min): 41  Visit #: 5 of 8     Treatment Area: Post concussion syndrome [F07.81]    SUBJECTIVE  Pain Level (0-10 scale): 3 symptoms, slight HA  Any medication changes, allergies to medications, adverse drug reactions, diagnosis change, or new procedure performed?: [x] No    [] Yes (see summary sheet for update)   Subjective functional status/changes:   [] No changes reported   Headache left frontal lobe area. OBJECTIVE  15 min Therapeutic Activity:  [x]? ? See flow sheet :   Rationale: increase ROM, increase strength and improve coordination to improve the patients ability to decrease concussion symptoms      18 min Neuromuscular Re-education:  [x]? ?  See flow sheet : resumed and increased balance and vestibular ex's per flow sheet   Rationale: increase strength, improve coordination, improve balance and increase proprioception  to improve the patients ability to improve concussion symptoms       8 min Gait Training:  Dynamic gait per flow sheet   Rationale:improve balance and coordination for greater ease w/ grocery shopping                                                                       With   [] TE   [] TA   [] neuro   [] other: Patient Education: [x] Review HEP    [] Progressed/Changed HEP based on:   [] positioning   [] body mechanics   [] transfers   [] heat/ice application    [] other:      Other Objective/Functional Measures: CSI 54     Pain Level (0-10 scale) post treatment: 4, eyes pulsing    ASSESSMENT/Changes in Function: Pt performs all exercises as directed with slight breaks in between activities as directed by therapist. 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 Patient will continue to benefit from skilled PT services to modify and progress therapeutic interventions, address functional mobility deficits, address ROM deficits, address strength deficits, analyze and address soft tissue restrictions, analyze and cue movement patterns, analyze and modify body mechanics/ergonomics, assess and modify postural abnormalities, address imbalance/dizziness and instruct in home and community integration to attain remaining goals. []  See Plan of Care  []  See progress note/recertification  []  See Discharge Summary         Progress towards goals / Updated goals:  1. Improve FOTO to 59 to indicate improved function with daily activities.   PN: remains, 50   2. Decrease CSI to <=10 to indicate improved function for work tasks. PN: progressing, 67   Current: progressing, CSI = 63 1/24/22, CSI 54 on 2/7/2022  3. Improve convergence to <= 6 cm to increase ease with reading.   PN: 16 cm no change   Current: very slow change, 15 cm (2/7/2022)  4. 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    PN: 2 HAs a week-progressing  Current: progressing, 1 very slight headache this week so far 2/2/22        PLAN  []  Upgrade activities as tolerated     [x]  Continue plan of care  []  Update interventions per flow sheet       []  Discharge due to:_  []  Other:_      Arjun Haney, PT 2/7/2022  8:40 AM    Future Appointments   Date Time Provider Shahid Mora   2/9/2022  8:45 AM Pascale Newby, JOVANAT [facility]   2/14/2022  8:30 AM Jim Candelario, PT [facility]   2/18/2022  9:45 AM Dev Mott, PT [facility]

## 2022-02-09 ENCOUNTER — HOSPITAL ENCOUNTER (OUTPATIENT)
Dept: PHYSICAL THERAPY | Age: 47
Discharge: HOME | End: 2022-02-09
Attending: FAMILY MEDICINE
Payer: MEDICAID

## 2022-02-09 PROCEDURE — 97112 NEUROMUSCULAR REEDUCATION: CPT | Performed by: PHYSICAL THERAPIST

## 2022-02-09 PROCEDURE — 97116 GAIT TRAINING THERAPY: CPT | Performed by: PHYSICAL THERAPIST

## 2022-02-09 PROCEDURE — 97530 THERAPEUTIC ACTIVITIES: CPT | Performed by: PHYSICAL THERAPIST

## 2022-02-09 NOTE — PROGRESS NOTES
PT DAILY TREATMENT NOTE     Patient Name: Casper Moment  Date:2022  : 1975  [x]  Patient  Verified  Payor: Lizzeth Lucy / Plan: [id number] / Product Type: Managed Care Medicaid /    In time:857  Out time:944  Total Treatment Time (min): 52  Visit #: 6 of 8    Treatment Area: Post concussion syndrome [F07.81]    SUBJECTIVE  Pain Level (0-10 scale): 2/10  Any medication changes, allergies to medications, adverse drug reactions, diagnosis change, or new procedure performed?: [x] No    [] Yes (see summary sheet for update)  Subjective functional status/changes:   [] No changes reported  Pt reports she feels pretty good today. Reports following last session she had dizziness that lasted longer than usual. Denies known irritant during session to cause that. States dizziness resolved by the end of the day. OBJECTIVE    20 min Therapeutic Activity:  [x]  See flow sheet :   Rationale: increase strength, improve coordination and improve balance  to improve the patients ability to improve activity tolerance and decrease concussion symptoms     17 min Neuromuscular Re-education:  [x]  See flow sheet : vision and balance ex's per flow sheet    Rationale: improve balance and increase proprioception  to improve the patients ability to improve activity tolerance and driving tolerance     10 min Gait Training:  Dynamic gait per flow sheet   Rationale:improve balance and coordination for greater ease w/ grocery shopping          With   [] TE   [] TA   [] neuro   [] other: Patient Education: [x] Review HEP    [] Progressed/Changed HEP based on:   [] positioning   [] body mechanics   [] transfers   [] heat/ice application    [] other:      Other Objective/Functional Measures: CSI = 57     Pain Level (0-10 scale) post treatment: 210    ASSESSMENT/Changes in Function: Pt experienced head spinning following session but remains at a 2/10 for symptoms.  Reports numerous times throughout the session that her body feels fine it's just her head that is unsettled. Discussed journaling to address some of the psychosocial issues she is still having following the concussion. Patient will continue to benefit from skilled PT services to modify and progress therapeutic interventions, address functional mobility deficits, analyze and cue movement patterns, address imbalance/dizziness and instruct in home and community integration to attain remaining goals. []  See Plan of Care  []  See progress note/recertification  []  See Discharge Summary         Progress towards goals / Updated goals:  Updated Goals: to be achieved in 4 weeks:  1. Improve FOTO to 59 to indicate improved function with daily activities.   PN: remains, 50   2. Decrease CSI to <=10 to indicate improved function for work tasks. PN: progressing, 67   Current: progressing, CSI = 63 1/24/22, CSI 54 on 2/7/2022  3. Improve convergence to <= 6 cm to increase ease with reading.   PN: 16 cm no change   Current: very slow change, 15 cm (2/7/2022)  4. 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    PN: 2 HAs a week-progressing  Current: progressing, 1 very slight headache this week so far 2/2/22     PLAN  [x]  Upgrade activities as tolerated     []  Continue plan of care  []  Update interventions per flow sheet       []  Discharge due to:_  []  Other:_      Jonnie Kenney DPT 2/9/2022  9:02 AM    Future Appointments   Date Time Provider Shahid Mora   2/10/2022  2:20 PM DO MARISSA Lopez AMB   2/14/2022  8:30 AM Marlene Tristan, PT [facility]   2/18/2022  9:45 AM Maynor Whitehead, PT [facility]

## 2022-02-10 ENCOUNTER — OFFICE VISIT (OUTPATIENT)
Dept: ORTHOPEDIC SURGERY | Age: 47
End: 2022-02-10
Payer: MEDICAID

## 2022-02-10 VITALS
HEIGHT: 64 IN | OXYGEN SATURATION: 98 % | BODY MASS INDEX: 28.85 KG/M2 | WEIGHT: 169 LBS | RESPIRATION RATE: 15 BRPM | HEART RATE: 74 BPM

## 2022-02-10 DIAGNOSIS — S06.0X0D CONCUSSION WITHOUT LOSS OF CONSCIOUSNESS, SUBSEQUENT ENCOUNTER: Primary | ICD-10-CM
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
## 2022-02-10 PROCEDURE — 99215 OFFICE O/P EST HI 40 MIN: CPT | Performed by: FAMILY MEDICINE

## 2022-02-10 NOTE — LETTER
NOTIFICATION RETURN TO WORK / SCHOOL    2/10/2022 2:53 PM    Ms. Bibi [phone]      To Whom It May Concern: Sathya Ray is currently under the care of [facility] [address] She will return to work/school on: 2/11/2021    Limit up to 2 hours per day up to 5 days per week. Allow accommodations for work station/computer screen and lights (sunglasses/ear plugs if needed). If there are questions or concerns please have the patient contact our office.         Sincerely,      Kasi Mitchell, DO
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
## 2022-02-10 NOTE — PROGRESS NOTES
Pt states that she has had some increase in her emotional status where she is more tearful. Pt states that she has had some episodes of balance issues and dizziness. Pt states that multitasking and focusing issues. Pt states that she is having visual issues. Pt states near sighted very blurred and far sight is good. Pt states that pressure to the back of the head has been relieved but with some sporadic times of feeling someone touching her hair. Memory loss has gotten but has some issues at times. Pt states that she has moments of issues with word finding. While walking she tends to walk in the direction to the right. Pt states that she has off balance issues and it feels like a she being pushed like waves are pushing her.

## 2022-02-10 NOTE — LETTER
2/10/2022    Patient: Donn Meredith   YOB: 1975   Date of Visit: 2/10/2022     Allison Boles DO  [address] [facility], [place] [address]  Via Fax: [phone]    Dear Allison Boles DO,      Thank you for referring Ms. Jonny Garza to [place] [address]. for evaluation. My notes for this consultation are attached. If you have questions, please do not hesitate to call me. I look forward to following your patient along with you.       Sincerely,    Juan Nova DO

## 2022-02-14 ENCOUNTER — HOSPITAL ENCOUNTER (OUTPATIENT)
Dept: PHYSICAL THERAPY | Age: 47
Discharge: HOME | End: 2022-02-14
Attending: FAMILY MEDICINE
Payer: MEDICAID

## 2022-02-14 PROCEDURE — 97112 NEUROMUSCULAR REEDUCATION: CPT

## 2022-02-14 PROCEDURE — 97116 GAIT TRAINING THERAPY: CPT

## 2022-02-14 PROCEDURE — 97530 THERAPEUTIC ACTIVITIES: CPT

## 2022-02-14 NOTE — PROGRESS NOTES
PT DAILY TREATMENT NOTE     Patient Name: Aydee Mallory  Date:2022  : 1975  [x]  Patient  Verified  Payor: Savita Fields / Plan: [employer] / Product Type: Managed Care Medicaid /    In time:840am  Out time:914am  Total Treatment Time (min): 34  Visit #: 7 of 8    Treatment Area: Post concussion syndrome [F07.81]    SUBJECTIVE  Pain Level (0-10 scale): 2  Any medication changes, allergies to medications, adverse drug reactions, diagnosis change, or new procedure performed?: [x] No    [] Yes (see summary sheet for update)  Subjective functional status/changes:   [] No changes reported  Reports she had her f/u with Renu Leo, who educated her that she needs to change her habits and work schedule to reduce prolonged exposure to irritants so she is not a \"detriment\" to her progress. Reports she is trying this week with a more spaced out schedule and considering switching to virtual appts. OBJECTIVE    10 min Therapeutic Activity:  [x]? ?? See flow sheet :   Rationale: increase ROM, increase strength and improve coordination to improve the patients ability to decrease concussion symptoms      18 min Neuromuscular Re-education:  [x]? ??  See flow sheet : resumed and increased balance and vestibular ex's per flow sheet   Rationale: increase strength, improve coordination, improve balance and increase proprioception  to improve the patients ability to improve concussion symptoms       8 min Gait Training:  gait with body on head turns 360's and 180s x120ft followed by 360's with head extension and head flexion 60ft each. Figure 8 head on body movements x 120ft.    Rationale:improve balance and coordination for greater ease w/ grocery shopping          With   [] TE   [] TA   [] neuro   [] other: Patient Education: [x] Review HEP    [] Progressed/Changed HEP based on:   [] positioning   [] body mechanics   [] transfers   [] heat/ice application    [] other:      Other Objective/Functional Measures: see progress towards goals     Pain Level (0-10 scale) post treatment: 2    ASSESSMENT/Changes in Function: Good reports of no increase in frontal HA following session. Continues to have greater difficulty with tandem VOR activities as well as cervical extension with body on head turns. Demonstrates progress with all goals at today's session, with a 56 CSI, reduced HA intensity, and improving convergence capacity. Patient will continue to benefit from skilled PT services to modify and progress therapeutic interventions, address functional mobility deficits, address ROM deficits, address strength deficits, analyze and address soft tissue restrictions, analyze and cue movement patterns, analyze and modify body mechanics/ergonomics, assess and modify postural abnormalities, address imbalance/dizziness and instruct in home and community integration to attain remaining goals. []  See Plan of Care  []  See progress note/recertification  []  See Discharge Summary         Progress towards goals / Updated goals:  Updated Goals: to be achieved in 4 weeks:  1. Improve FOTO to 59 to indicate improved function with daily activities.   PN: remains, 50   2. Decrease CSI to <=10 to indicate improved function for work tasks. PN: progressing, 67   Current: progressing, CSI = 56 (2/14/2022)  3. Improve convergence to <= 6 cm to increase ease with reading.   PN: 16 cm no change   Current: very slow change, 13 cm (2/14/2022)   4. 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    PN: 2 HAs a week-progressing  Current: progressing, MAGANA's \"more spaced out and less frequent\"  Now every 3-4 days versus daily. Less severe.  (2/14/2022)     PLAN  []  Upgrade activities as tolerated     [x]  Continue plan of care  []  Update interventions per flow sheet       []  Discharge due to:_  []  Other:_      Tim Marquis, PT 2/14/2022  8:45 AM    Future Appointments   Date Time Provider Department [place]   2/18/2022  9:45 AM Valerie RICHARDSON PT [facility]   3/10/2022  2:00 PM DO MARISSA [place] AMB

## 2022-02-18 ENCOUNTER — HOSPITAL ENCOUNTER (OUTPATIENT)
Dept: PHYSICAL THERAPY | Age: 47
Discharge: HOME | End: 2022-02-18
Attending: FAMILY MEDICINE
Payer: MEDICAID

## 2022-02-18 PROCEDURE — 97116 GAIT TRAINING THERAPY: CPT

## 2022-02-18 PROCEDURE — 97530 THERAPEUTIC ACTIVITIES: CPT

## 2022-02-18 PROCEDURE — 97112 NEUROMUSCULAR REEDUCATION: CPT

## 2022-02-18 NOTE — PROGRESS NOTES
[facility]  [address]  [place], [address]  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 Improve FOTO to 59 to indicate improved function with daily activities. PN: remains, 50   Current:decreased to 49 (eval was 47)  2. Decrease CSI to <=10 to indicate improved function for work tasks. PN: progressing, 67   Current: progressing, CSI = 56 (2022); 63 22  3. Improve convergence to <= 6 cm to increase ease with reading. PN: 16 cm no change   Current: very slow change, 13 cm (2022); 16 cm no change  4. 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 Less severe. (2022)   Key Functional Changes: Less HAs; has adjusted work schedule and eliminated traveling to  students    Updated Goals: to be achieved in 4 weeks:  1. Improve FOTO to 59 to indicate improved function with daily activities. PN :decreased to 49 (eval was 47)  2. Decrease CSI to <=10 to indicate improved function for work tasks. PN: CSI = 56 (2022); 63 22  3. Improve convergence to <= 6 cm to increase ease with reading. PN: very slow change, 13 cm (2022); 16 cm no change  4. 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 PN: progressing, HA's \"more spaced out and less frequent\"  Now every 3-4 days versus daily. Less severe. (2/14/2022)   Key Functional Changes: Less HAs; has adjusted work schedule and eliminated traveling to  students  CSI increased back up to 61 today. Extremely slow improvement over the last month . CSI increased and FOTO decreased today indicating slow progress. Will continue patient for another month to continue to work towards goals. Patient has adjusted work schedule and has eliminated traveling      ASSESSMENT/RECOMMENDATIONS:  [x]Continue therapy per initial plan/protocol at a frequency of  2 x per week for 4 weeks  []Continue therapy with the following recommended changes:_____________________      _____________________________________________________________________  []Discontinue therapy progressing towards or have reached established goals  []Discontinue therapy due to lack of appreciable progress towards goals  []Discontinue therapy due to lack of attendance or compliance  []Await Physician's recommendations/decisions regarding therapy  []Other:________________________________________________________________    Thank you for this referral.    Anna Marie Cortez, PT 2/18/2022 9:00 AM  NOTE TO PHYSICIAN:  PLEASE COMPLETE THE ORDERS BELOW AND   [facility]: [phone]  If you are unable to process this request in 24 hours please contact our office: [phone]    [x]  I have read the above report and request that my patient continue as recommended. I have read the above report and request that my patient continue therapy with the following changes/special instructions:__________________________________________________________  I have read the above report and request that my patient be discharged from therapy.     Physicians signature: ______________________________Date: ______Time:______     Aleksander Chen DO

## 2022-02-18 NOTE — PROGRESS NOTES
PT DAILY TREATMENT NOTE 10-18    Patient Name: Aydee Mallory  Date:2022  : 1975  [x]  Patient  Verified  Payor: Savita Fields / Plan: Dheeraj Master / Product Type: Managed Care Medicaid /    In time:947  Out time:1031  Total Treatment Time (min): 44  Visit #: 8 of 8  Treatment Area: Post concussion syndrome [F07.81]    SUBJECTIVE  Pain Level (0-10 scale): 0-1  Any medication changes, allergies to medications, adverse drug reactions, diagnosis change, or new procedure performed?: [x] No    [] Yes (see summary sheet for update)  Subjective functional status/changes:   [] No changes reported  Slight HA today. I had to do a deadline to meet yesterday. It was a 12 page report, so that really made me exhausted. OBJECTIVE    15 min Therapeutic Activity:  [x]  See flow sheet :   Rationale: increase strength, improve coordination, improve balance and increase proprioception  to improve the patients ability to perform daily activities      21 min Neuromuscular Re-education:  [x]  See flow sheet :   Rationale: improve coordination, improve balance and increase proprioception  to improve the patients ability to perform ADLs    8 min Gait Training:  gait with body on head turns 360's and 180s x120ft followed by 360's with head extension and head flexion 60ft each. Figure 8 head on body movements x 120ft. Rationale: to increase ease with grocery shopping    With   [] TE   [] TA   [] neuro   [] other: Patient Education: [x] Review HEP    [] Progressed/Changed HEP based on:   [] positioning   [] body mechanics   [] transfers   [] heat/ice application    [] other:      Other Objective/Functional Measures:      Pain Level (0-10 scale) post treatment: 1    ASSESSMENT/Changes in Function: CSI increased back up to 63 today. Extremely slow improvement over the last month . CSI increased and FOTO decreased today indicating slow progress.  Will continue patient for another month to continue to work towards goals. Patient has adjusted work schedule and has eliminated traveling      Patient will continue to benefit from skilled PT services to modify and progress therapeutic interventions, analyze and cue movement patterns and address imbalance/dizziness to attain remaining goals. []  See Plan of Care  [x]  See progress note/recertification  []  See Discharge Summary         Progress towards goals / Updated goals:  Updated Goals: to be achieved in 4 weeks:  1. Improve FOTO to 59 to indicate improved function with daily activities.   PN: remains, 50   Current:decreased to 49 (eval was 47)  2. Decrease CSI to <=10 to indicate improved function for work tasks. PN: progressing, 67   Current: progressing, CSI = 56 (2/14/2022); 63 2/18/22  3. Improve convergence to <= 6 cm to increase ease with reading.   PN: 16 cm no change   Current: very slow change, 13 cm (2/14/2022); 16 cm no change  4. 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    PN: 2 HAs a week-progressing  Current: progressing, MAGANA's \"more spaced out and less frequent\"  Now every 3-4 days versus daily. Less severe.  (2/14/2022)     PLAN  [x]  Upgrade activities as tolerated     []  Continue plan of care  []  Update interventions per flow sheet       []  Discharge due to:_  []  Other:_      Ana Cerrato, MPT, CMTPT 2/18/2022  8:52 AM    Future Appointments   Date Time Provider Shahid Mora   2/18/2022  9:45 AM Mia Frey, PT [facility]   2/21/2022  8:30 AM Lynda Roman Catholic, PT [facility]   2/23/2022  8:45 AM Arben Velez, DPT [facility]   2/28/2022  8:30 AM Lynda Roman Catholic, PT [facility]   3/2/2022  8:45 AM Arben Velez, DPT [facility]   3/7/2022  8:30 AM Lynda Roman Catholic, PT [facility]   3/9/2022  8:45 AM Arben Velez, DPT [facility]   3/10/2022  2:00 PM DO MARISSA Concepcion BS AMB   3/14/2022  8:30 AM Lynda Roman Catholic, PT [facility]

## 2022-02-21 ENCOUNTER — HOSPITAL ENCOUNTER (OUTPATIENT)
Dept: PHYSICAL THERAPY | Age: 47
Discharge: HOME | End: 2022-02-21
Attending: FAMILY MEDICINE
Payer: MEDICAID

## 2022-02-21 PROCEDURE — 97112 NEUROMUSCULAR REEDUCATION: CPT

## 2022-02-21 PROCEDURE — 97530 THERAPEUTIC ACTIVITIES: CPT

## 2022-02-21 PROCEDURE — 97116 GAIT TRAINING THERAPY: CPT

## 2022-02-21 NOTE — PROGRESS NOTES
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 OBJECTIVE    10 min Therapeutic Activity:  [x]? ??? See flow sheet :   Rationale: increase ROM, increase strength and improve coordination to improve the patients ability to decrease concussion symptoms      21 min Neuromuscular Re-education:  [x]? ???  See flow sheet : resumed and increased balance and vestibular ex's per flow sheet   Rationale: increase strength, improve coordination, improve balance and increase proprioception  to improve the patients ability to improve concussion symptoms       8 min Gait Training:  gait with body on head turns 360's and 180s x120ft followed by 360's with head extension and head flexion 60ft each. Figure 8 head on body movements x 120ft.    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 Improved CSI demonstrated and improving stability with eyes closed on foam beam. Pencil pushes are unchanged today, and may benefit from direction to bring the note in slightly closer than comfortable as able. Patient will continue to benefit from skilled PT services to modify and progress therapeutic interventions, address functional mobility deficits, address ROM deficits, address strength deficits, analyze and address soft tissue restrictions, analyze and cue movement patterns, analyze and modify body mechanics/ergonomics, assess and modify postural abnormalities, address imbalance/dizziness and instruct in home and community integration to attain remaining goals. []  See Plan of Care  []  See progress note/recertification  []  See Discharge Summary         Progress towards goals / Updated goals:  1. Improve FOTO to 59 to indicate improved function with daily activities.   PN :decreased to 49 (eval was 47)  2. Decrease CSI to <=10 to indicate improved function for work tasks. PN: CSI =  63  Current: progressing, 48 (2/21/2022)   3. Improve convergence to <= 6 cm to increase ease with reading.    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    PN: progressing, MAGANA's \"more spaced out and less frequent\"  Now every 3-4 days versus daily. Less severe.     PLAN   []  Upgrade activities as tolerated     [x]  Continue plan of care  []  Update interventions per flow sheet       []  Discharge due to:_  []  Other:_      Rand Stone, PT 2/21/2022  8:44 AM    Future Appointments   Date Time Provider Shahid Mora   2/23/2022  8:45 AM [facility]   2/28/2022  8:30 AM [facility]ylah Hartman, [facility]   3/2/2022  8:45 AM Eveline Baileya, DPT [facility]   3/7/2022  8:30 AM Kylah Hartman, PT [facility]   3/9/2022  8:45 AM Eveline Ross, [facility]   3/10/2022  2:00 PM Parul Bañuelos DO [facility] PINA [facility] AMB   3/14/2022  8:30 AM Marlene Tristan, PT [facility]

## 2022-02-23 ENCOUNTER — HOSPITAL ENCOUNTER (OUTPATIENT)
Dept: PHYSICAL THERAPY | Age: 47
Discharge: HOME | End: 2022-02-23
Attending: FAMILY MEDICINE
Payer: MEDICAID

## 2022-02-23 PROCEDURE — 97112 NEUROMUSCULAR REEDUCATION: CPT | Performed by: PHYSICAL THERAPIST

## 2022-02-23 PROCEDURE — 97530 THERAPEUTIC ACTIVITIES: CPT | Performed by: PHYSICAL THERAPIST

## 2022-02-23 PROCEDURE — 97116 GAIT TRAINING THERAPY: CPT | Performed by: PHYSICAL THERAPIST

## 2022-02-23 NOTE — PROGRESS NOTES
PT DAILY TREATMENT NOTE     Patient Name: Emilee Martinez  Date:2022  : 1975  [x]  Patient  Verified  Payor: Othelia Galeazzi / Plan: [id number] / Product Type: Managed Care Medicaid /    In time:847  Out time:948  Total Treatment Time (min): 61  Visit #: 2 of 8    Treatment Area: Post concussion syndrome [F07.81]    SUBJECTIVE  Pain Level (0-10 scale): 1/10  Any medication changes, allergies to medications, adverse drug reactions, diagnosis change, or new procedure performed?: [x] No    [] Yes (see summary sheet for update)  Subjective functional status/changes:   [] No changes reported  Pt reports she is going to see vision therapy today at 220p. OBJECTIVE    24 min Therapeutic Activity:  [x]  See flow sheet : progressed per flow sheet   Rationale: improve coordination and improve balance  to improve the patients ability to improve activity tolerance      27 min Neuromuscular Re-education:  [x]  See flow sheet : balance and vestibular ex's per flow sheet   Rationale: improve coordination, improve balance and increase proprioception  to improve the patients ability to improve grocery shopping and driving tolerance     10 min Gait Training:  Dynamic gait per flow sheet   Rationale: improve balance and coordination for greater ease with grocery shopping          With   [] TE   [] TA   [] neuro   [] other: Patient Education: [x] Review HEP    [] Progressed/Changed HEP based on:   [] positioning   [] body mechanics   [] transfers   [] heat/ice application    [] other:      Other Objective/Functional Measures: CSI = 49     Pain Level (0-10 scale) post treatment: 1-1.5 dizziness    ASSESSMENT/Changes in Function: Pt is progressing well and tolerating increased head movements with ambulation. Progressed vision ex's to increase time and to perform in closer distance to her nose testing focus and convergence. Pt performed well overall and required no rest breaks.      Patient will continue to benefit from skilled PT services to modify and progress therapeutic interventions, analyze and cue movement patterns, address imbalance/dizziness and instruct in home and community integration to attain remaining goals. []  See Plan of Care  []  See progress note/recertification  []  See Discharge Summary         Progress towards goals / Updated goals:  Updated Goals: to be achieved in 4 weeks:  1. Improve FOTO FY 61 ST indicate improved function with daily activities.   PN :decreased to 49 (eval was 47)  2. Decrease CSI to <=10 to indicate improved function for work tasks. PN: CSI =  63  Current: progressing, 48 (2/21/2022)   3. Improve convergence to <= 6 cm to increase ease with reading.    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    PN: progressing, HA's \"more spaced out and less frequent\"  Now every 3-4 days versus daily. Less severe.     PLAN  [x]  Upgrade activities as tolerated     []  Continue plan of care  []  Update interventions per flow sheet       []  Discharge due to:_  []  Other:_      Paxton Colon DPT 2/23/2022  9:05 AM    Future Appointments   Date Time Provider Shahid Mora   2/28/2022  8:30 AM Iram Chandler, PT [facility]   3/2/2022  8:45 AM Sanjay Varma DPT [facility]   3/7/2022  8:30 AM Iram Chandler, PT [facility]   3/9/2022  8:45 AM Sanjay Varma DPT [facility]   3/10/2022  2:00 PM DO [facility]SA Addison AMB   3/14/2022  8:30 AM Iram Sis, PT [facility]

## 2022-02-28 ENCOUNTER — HOSPITAL ENCOUNTER (OUTPATIENT)
Dept: PHYSICAL THERAPY | Age: 47
Discharge: HOME | End: 2022-02-28
Attending: FAMILY MEDICINE
Payer: MEDICAID

## 2022-02-28 PROCEDURE — 97110 THERAPEUTIC EXERCISES: CPT

## 2022-02-28 PROCEDURE — 97116 GAIT TRAINING THERAPY: CPT

## 2022-02-28 PROCEDURE — 97112 NEUROMUSCULAR REEDUCATION: CPT

## 2022-02-28 PROCEDURE — 97530 THERAPEUTIC ACTIVITIES: CPT

## 2022-02-28 NOTE — PROGRESS NOTES
PT DAILY TREATMENT NOTE     Patient Name: Theodor Done  Date:2022  : 1975  [x]  Patient  Verified  Payor: Kamini Varghesekindra / Plan: [id number] / Product Type: Managed Care Medicaid /    In time:832am  Out time:917am  Total Treatment Time (min): 45  Visit #: 3 of 8    Medicare/BCBS Only   Total Timed Codes (min):  45 1:1 Treatment Time:  45       Treatment Area: Post concussion syndrome [F07.81]    SUBJECTIVE  Pain Level (0-10 scale): 1  Any medication changes, allergies to medications, adverse drug reactions, diagnosis change, or new procedure performed?: [x] No    [] Yes (see summary sheet for update)  Subjective functional status/changes:   [] No changes reported  Pt is going to try prism glasses for 6 weeks to see if her double vision improves and if this doesn't help the vision MD will have her come in for more f/u. Will get these new glasses in 7-10 days. Vision doctor told her that her right eye is resting lower than the left and she tilts her head to the right when she is focusing. Pt states she feels like the right eye needs to focus harder. OBJECTIVE    10 min Therapeutic Activity:  [x]? See flow sheet : progressed per flow sheet   Rationale: improve coordination and improve balance  to improve the patients ability to improve activity tolerance      25 min Neuromuscular Re-education:  [x]?   See flow sheet : balance and vestibular ex's per flow sheet   Rationale: improve coordination, improve balance and increase proprioception  to improve the patients ability to improve grocery shopping and driving tolerance      10 min Gait Training:  Dynamic gait per flow sheet   Rationale: improve balance and coordination for greater ease with grocery shopping          With   [] TE   [] TA   [] neuro   [] other: Patient Education: [x] Review HEP    [] Progressed/Changed HEP based on:   [] positioning   [] body mechanics   [] transfers   [] heat/ice application    [] other:      Other Objective/Functional Measures: exercises per flowsheet     Pain Level (0-10 scale) post treatment: 1.5 eye fatigue. ASSESSMENT/Changes in Function: Pt does well with completing all vestibular challenges as assigned. She has some trouble with balance when directed to perform progression of diagonals VOR with tandem stance on airex beam. Pt notes right eye fatigue following session but that her left eye feels better. Patient will continue to benefit from skilled PT services to modify and progress therapeutic interventions, address functional mobility deficits, address ROM deficits, address strength deficits, analyze and address soft tissue restrictions, analyze and cue movement patterns, analyze and modify body mechanics/ergonomics, assess and modify postural abnormalities, address imbalance/dizziness and instruct in home and community integration to attain remaining goals. []  See Plan of Care  []  See progress note/recertification  []  See Discharge Summary         Progress towards goals / Updated goals:  Updated Goals: to be achieved in 4 weeks:  1. Improve FOTO WD 22 VZ indicate improved function with daily activities.   PN :decreased to 49 (eval was 47)  2. Decrease CSI to <=10 to indicate improved function for work tasks. PN: CSI =  63  Current: progressing, 48 (2/21/2022)   3.Improve convergence to <= 6 cm to increase ease with reading.    PN: very slow change, 16 cm no change   4. 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    PN: progressing, HA's \"more spaced out and less frequent\"  Now every 3-4 days versus daily. Less severe.   Current: \"better, one last week\" (2/28/2022)    PLAN  []  Upgrade activities as tolerated     [x]  Continue plan of care   []  Update interventions per flow sheet       []  Discharge due to:_  []  Other:_      Climmie Car, PT 2/28/2022  8:36 AM    Future Appointments   Date Time Provider Shahid Mora   3/2/2022  8:45 AM Jeremy Randall, DPT [facility]   3/7/2022  8:30 AM Patrici Cargo, PT [facility]   3/9/2022  8:45 AM Jeremy Randall, JOVANAT [facility]   3/10/2022  2:00 PM DO MARISSA Roca BS AMB   3/14/2022  8:30 AM Patrici Cargo, PT [facility]

## 2022-03-02 ENCOUNTER — HOSPITAL ENCOUNTER (OUTPATIENT)
Dept: PHYSICAL THERAPY | Age: 47
Discharge: HOME | End: 2022-03-02
Attending: FAMILY MEDICINE
Payer: MEDICAID

## 2022-03-02 PROCEDURE — 97530 THERAPEUTIC ACTIVITIES: CPT | Performed by: PHYSICAL THERAPIST

## 2022-03-02 PROCEDURE — 97116 GAIT TRAINING THERAPY: CPT | Performed by: PHYSICAL THERAPIST

## 2022-03-02 PROCEDURE — 97112 NEUROMUSCULAR REEDUCATION: CPT | Performed by: PHYSICAL THERAPIST

## 2022-03-02 NOTE — PROGRESS NOTES
PT DAILY TREATMENT NOTE     Patient Name: Deronda Sandhoff  Date:3/2/2022  : 1975  [x]  Patient  Verified  Payor: Shelly Tian / Plan: [id number] [employer] / Product Type: Managed Care Medicaid /    In time:846  Out time:930  Total Treatment Time (min): 44  Visit #: 4 of 8    Treatment Area: Post concussion syndrome [F07.81]    SUBJECTIVE  Pain Level (0-10 scale): 10 dizzy  Any medication changes, allergies to medications, adverse drug reactions, diagnosis change, or new procedure performed?: [x] No    [] Yes (see summary sheet for update)  Subjective functional status/changes:   [] No changes reported  Pt reports she had a driving experience on Monday where she had to drive through construction and had a bad experience that ended with getting sick. Reports that carried over into yesterday and even today she's feeling more dizzy than before. OBJECTIVE    10 min Therapeutic Activity:  [x]  See flow sheet : progressed per flow sheet   Rationale: improve coordination and improve balance  to improve the patients ability to improve balance and concussion symptoms     24 min Neuromuscular Re-education:  [x]  See flow sheet : balance and vestibular ex's per flow sheet   Rationale: improve balance and increase proprioception  to improve the patients ability to improve driving and grocery shopping tolerance    10 min Gait Training: dynamic gait per flow sheet   Rationale: improve balance and coordination for greater ease with shopping          With   [] TE   [] TA   [] neuro   [] other: Patient Education: [x] Review HEP    [] Progressed/Changed HEP based on:   [] positioning   [] body mechanics   [] transfers   [] heat/ice application    [] other:      Other Objective/Functional Measures: CSI = 54; convergence = 13cm     Pain Level (0-10 scale) post treatment: 1.5/10 right eye fatigue    ASSESSMENT/Changes in Function: Pt is making progress, still awaiting her prism glasses.  Pt c/o \"terminator\" eye on the right following treatment. Pt completes ex's today with slightly more symptoms during session but minimally increased symptoms following session. Improved tandem balance EC today on foam. Convergence improving slightly. Patient will continue to benefit from skilled PT services to modify and progress therapeutic interventions, analyze and cue movement patterns, assess and modify postural abnormalities, address imbalance/dizziness and instruct in home and community integration to attain remaining goals. []  See Plan of Care  []  See progress note/recertification  []  See Discharge Summary         Progress towards goals / Updated goals:  Updated Goals: to be achieved in 4 weeks:  1. Improve FOTO KT 83 UB indicate improved function with daily activities.   PN :decreased to 49 (eval was 47)  2. Decrease CSI to <=10 to indicate improved function for work tasks. PN: CSI =  63  Current: progressing, 48 (2/21/2022)   3.Improve convergence to <= 6 cm to increase ease with reading.    PN: very slow change, 16 cm no change   Current: progressing, convergence = 13cm 3/2/22  4. 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    PN: progressing, HA's \"more spaced out and less frequent\"  Now every 3-4 days versus daily. Less severe.   Current: \"better, one last week\" (2/28/2022)    PLAN  [x]  Upgrade activities as tolerated     []  Continue plan of care  []  Update interventions per flow sheet       []  Discharge due to:_  []  Other:_      Raquel Champion, ROCAEL 3/2/2022  8:49 AM    Future Appointments   Date Time Provider Shahid Mora   3/7/2022  8:30 AM Laurence Chamorro, PT [facility]   3/9/2022  8:45 AM Josie Ellis DPT [facility]   3/10/2022  2:00 PM DO MARISSA Albert AMB   3/14/2022  8:30 AM Laurence Chamorro, PT [facility]

## 2022-03-07 ENCOUNTER — HOSPITAL ENCOUNTER (OUTPATIENT)
Dept: PHYSICAL THERAPY | Age: 47
Discharge: HOME | End: 2022-03-07
Attending: FAMILY MEDICINE
Payer: MEDICAID

## 2022-03-07 PROCEDURE — 97116 GAIT TRAINING THERAPY: CPT

## 2022-03-07 PROCEDURE — 97530 THERAPEUTIC ACTIVITIES: CPT

## 2022-03-07 PROCEDURE — 97112 NEUROMUSCULAR REEDUCATION: CPT

## 2022-03-07 NOTE — PROGRESS NOTES
PT DAILY TREATMENT NOTE     Patient Name: Tony Mansfield  Date:3/7/2022  : 1975  [x]  Patient  Verified  Payor: [facility] / Plan: Karen Cordoba / Product Type: Managed Care Medicaid /    In time:834AM (therapist)  Out time:909am  Total Treatment Time (min): 35  Visit #: 5 of 8     Treatment Area: Post concussion syndrome [F07.81]    SUBJECTIVE  Pain Level (0-10 scale): 1  Any medication changes, allergies to medications, adverse drug reactions, diagnosis change, or new procedure performed?: [x] No    [] Yes (see summary sheet for update)  Subjective functional status/changes:   [] No changes reported  Reports it took a while for the last episode to resolve and she had to take a longer break from using the computer for work. OBJECTIVE  10 min Therapeutic Activity:  [x]? See flow sheet : progressed per flow sheet   Rationale: improve coordination and improve balance  to improve the patients ability to improve balance and concussion symptoms     17 min Neuromuscular Re-education:  [x]? See flow sheet : balance and vestibular ex's per flow sheet   Rationale: improve balance and increase proprioception  to improve the patients ability to improve driving and grocery shopping tolerance     8 min Gait Traininft each forward and backward gait -- diagonal head on body, 360s and 180s body on head, figure 8 head on body   Rationale: improve balance and coordination for greater ease with shopping          With   [] TE   [] TA   [] neuro   [] other: Patient Education: [x] Review HEP    [] Progressed/Changed HEP based on:   [] positioning   [] body mechanics   [] transfers   [] heat/ice application    [] other:      Other Objective/Functional Measures: CSI 47     Pain Level (0-10 scale) post treatment: 1.5    ASSESSMENT/Changes in Function: Pt does well with quick changes with dynamic gait challenges today.  Overall, pt demonstrates good independence with exercises once instructed on how to do so, requiring guidance more for pacing and pt education on stress for vision and VOR. Patient will continue to benefit from skilled PT services to modify and progress therapeutic interventions, address functional mobility deficits, address strength deficits, analyze and address soft tissue restrictions, analyze and cue movement patterns, analyze and modify body mechanics/ergonomics, assess and modify postural abnormalities, address imbalance/dizziness and instruct in home and community integration to attain remaining goals. []  See Plan of Care  []  See progress note/recertification  []  See Discharge Summary         Progress towards goals / Updated goals:  1. Improve FOTO UD 17 HG indicate improved function with daily activities.   PN decreased to 49 (eval was 47)  2. Decrease CSI to <=10 to indicate improved function for work tasks. PN: CSI =  63  Current: progressing, 47 (3/7/2022)   3.Improve convergence to <= 6 cm to increase ease with reading.    PN: very slow change, 16 cm no change   Current: progressing, convergence = 13cm (3/2/22)  4. 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    PN: progressing, HA's \"more spaced out and less frequent\"  Now every 3-4 days versus daily. Less severe.   Current: \"better, one last week\" (2/28/2022)    PLAN  []  Upgrade activities as tolerated     [x]  Continue plan of care   []  Update interventions per flow sheet       []  Discharge due to:_  []  Other:_      Bill Mccullough, PT 3/7/2022  8:42 AM    Future Appointments   Date Time Provider Shahid Mora   3/9/2022  8:45 AM Wilmar Nelson DPT [facility]   3/10/2022  2:00 PM DO MARISSA Fabian AMB   3/14/2022  8:30 AM Marcia Hackett, PT [facility]

## 2022-03-09 ENCOUNTER — HOSPITAL ENCOUNTER (OUTPATIENT)
Dept: PHYSICAL THERAPY | Age: 47
Discharge: HOME | End: 2022-03-09
Attending: FAMILY MEDICINE
Payer: MEDICAID

## 2022-03-09 PROCEDURE — 97530 THERAPEUTIC ACTIVITIES: CPT | Performed by: PHYSICAL THERAPIST

## 2022-03-09 PROCEDURE — 97116 GAIT TRAINING THERAPY: CPT | Performed by: PHYSICAL THERAPIST

## 2022-03-09 PROCEDURE — 97112 NEUROMUSCULAR REEDUCATION: CPT | Performed by: PHYSICAL THERAPIST

## 2022-03-09 NOTE — PROGRESS NOTES
PT DAILY TREATMENT NOTE     Patient Name: Cassandra Valentine  Date:3/9/2022  : 1975  [x]  Patient  Verified  Payor: [employer] / Plan: Tamara Solos / Product Type: Managed Care Medicaid /    In time:854  Out time:941  Total Treatment Time (min): 47  Visit #: 6 of 8    Treatment Area: Post concussion syndrome [F07.81]    SUBJECTIVE  Pain Level (0-10 scale): 1/10  Any medication changes, allergies to medications, adverse drug reactions, diagnosis change, or new procedure performed?: [x] No    [] Yes (see summary sheet for update)  Subjective functional status/changes:   [] No changes reported  Pt received her prism glasses yesterday evening. Reports at first she was dizzy and had a headache but she had already completed her day and had some increased symptoms. Reports this morning when she put them on everything was great, more crisp, and she even feels that her neck and shoulders are more relaxed.      OBJECTIVE    15 min Therapeutic Activity:  [x]  See flow sheet : progressed per flow sheet   Rationale: improve coordination and improve balance  to improve the patients ability to improve balance and activity tolerance      22 min Neuromuscular Re-education:  [x]  See flow sheet : balance and vestibular ex's per flow sheet   Rationale: improve coordination, improve balance and increase proprioception  to improve the patients ability to improve concussion symptoms     10 min Gait Training: dynamic gait per flow sheet   Rationale: improve balance and coordination for greater ease with shopping            With   [] TE   [] TA   [] neuro   [] other: Patient Education: [x] Review HEP    [] Progressed/Changed HEP based on:   [] positioning   [] body mechanics   [] transfers   [] heat/ice application    [] other:      Other Objective/Functional Measures: CSI = 48     Pain Level (0-10 scale) post treatment: 1/10    ASSESSMENT/Changes in Function: Pt is making progress toward goals though it was slow progress more recently, things seem to be improving with the arrival of her prism glasses yesterday. Plan to continue to monitor and possible decrease to 1x/week to ensure that progress continues with new glasses. Noted today pt was able to better process multi-step directions and maintain balance through dynamic gait activities. Patient will continue to benefit from skilled PT services to modify and progress therapeutic interventions, analyze and cue movement patterns, address imbalance/dizziness and instruct in home and community integration to attain remaining goals. []  See Plan of Care  []  See progress note/recertification  []  See Discharge Summary         Progress towards goals / Updated goals:  Updated Goals: to be achieved in 4 weeks:  1. Improve FOTO DH 75 IZ indicate improved function with daily activities.   PN decreased to 49 (eval was 47)  Current: progressing, FOTO = 51 3/9/22  2. Decrease CSI to <=10 to indicate improved function for work tasks. PN: CSI =  63  Current: progressing, 48 (3/9/2022)   3.Improve convergence to <= 6 cm to increase ease with reading.    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    PN: progressing, HA's \"more spaced out and less frequent\"  Now every 3-4 days versus daily. Less severe.   Current: met, pt reports 70-75% overall improvement in headaches 3/9/2022    PLAN  [x]  Upgrade activities as tolerated     []  Continue plan of care  []  Update interventions per flow sheet       []  Discharge due to:_  []  Other:_      Thi Zamora DPT 3/9/2022  8:52 AM    Future Appointments   Date Time Provider Shahid Mora   3/10/2022  2:00 PM DO MARISSA Concepcion BS AMB   3/14/2022  8:30 AM Lynda Rich, PT [facility]

## 2022-03-09 NOTE — PROGRESS NOTES
[facility]  [address]  [place], [address]  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 Improve FOTO to 59 to indicate improved function with daily activities. PN decreased to 49 (eval was 47)  Current: progressing, FOTO = 51   2. Decrease CSI to <=10 to indicate improved function for work tasks. PN: CSI =  63  Current: progressing, 48  3. Improve convergence to <= 6 cm to increase ease with reading. PN: very slow change, 16 cm no change   Current: progressing, convergence = 13cm   4. 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 PN: progressing, HA's \"more spaced out and less frequent\"  Now every 3-4 days versus daily. Less severe. Current: met, pt reports 70-75% overall improvement in headaches     Key Functional Changes: improved double vision w/prism glasses, improved tolerance to multi-step instructions    Updated Goals: to be achieved in 4 weeks:  1. Improve FOTO to 59 to indicate improved function with daily activities. PN: progressing, FOTO = 51   2. Decrease CSI to <=10 to indicate improved function for work tasks. PN: progressing, 48  3. Improve convergence to <= 6 cm to increase ease with reading.     PN: progressing, convergence = 13cm       ASSESSMENT/RECOMMENDATIONS:  Pt is making progress toward goals though it was slow progress more recently, things seem to be improving with the arrival of her prism glasses yesterday. Plan to continue to monitor and possible decrease to 1x/week to ensure that progress continues with new glasses. Noted today pt was able to better process multi-step directions and maintain balance through dynamic gait activities. Patient will continue to benefit from skilled PT services to modify and progress therapeutic interventions, analyze and cue movement patterns, address imbalance/dizziness and instruct in home and community integration to attain remaining goals. [x]Continue therapy per initial plan/protocol at a frequency of  1-2 x per week for 4 weeks  []Continue therapy with the following recommended changes:_____________________      _____________________________________________________________________  []Discontinue therapy progressing towards or have reached established goals  []Discontinue therapy due to lack of appreciable progress towards goals  []Discontinue therapy due to lack of attendance or compliance  []Await Physician's recommendations/decisions regarding therapy  []Other:________________________________________________________________    Thank you for this referral.    Rosemary Gonzalez DPT 3/9/2022 8:54 AM  NOTE TO PHYSICIAN:  PLEASE COMPLETE THE ORDERS BELOW AND   FAX TO [facility]: [phone]  If you are unable to process this request in 24 hours please contact our office: [phone]    [x]  I have read the above report and request that my patient continue as recommended. I have read the above report and request that my patient continue therapy with the following changes/special instructions:__________________________________________________________  I have read the above report and request that my patient be discharged from therapy.     [phone] signature: ______________________________Date: ______Time:______     Tod

## 2022-03-10 ENCOUNTER — TELEPHONE (OUTPATIENT)
Dept: ORTHOPEDIC SURGERY | Age: 47
End: 2022-03-10

## 2022-03-10 ENCOUNTER — OFFICE VISIT (OUTPATIENT)
Dept: ORTHOPEDIC SURGERY | Age: 47
End: 2022-03-10
Payer: MEDICAID

## 2022-03-10 VITALS
OXYGEN SATURATION: 96 % | RESPIRATION RATE: 16 BRPM | HEART RATE: 91 BPM | WEIGHT: 171.8 LBS | BODY MASS INDEX: 29.33 KG/M2 | HEIGHT: 64 IN

## 2022-03-10 DIAGNOSIS — S06.0X0D CONCUSSION WITHOUT LOSS OF CONSCIOUSNESS, SUBSEQUENT ENCOUNTER: Primary | ICD-10-CM
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
## 2022-03-10 PROCEDURE — 99214 OFFICE O/P EST MOD 30 MIN: CPT | Performed by: FAMILY MEDICINE

## 2022-03-10 RX ORDER — TRAZODONE HYDROCHLORIDE 50 MG/1
50-100 TABLET ORAL
Qty: 60 TABLET | Refills: 2 | Status: SHIPPED | OUTPATIENT
Start: 2022-03-10 | End: 2022-09-06 | Stop reason: ALTCHOICE

## 2022-03-10 NOTE — TELEPHONE ENCOUNTER
Patient called and said she saw Lydia August today. Patient said she spoke to Lydia August about her Vertigo. Patient said she forgot to ask Lydia August if he is able to prescribe her something for the Vertigo. [facility] [address] in [address] [place] [address]. Patient tel. [phone].

## 2022-03-10 NOTE — LETTER
3/10/2022    Patient: Sathya Ray   YOB: 1975   Date of Visit: 3/10/2022     Titus Bustamante DO  [address] [address]  Via Fax: [phone]    Dear Titus Bustamante DO,      Thank you for referring Ms. Marisol Moya to [facility]. for evaluation. My notes for this consultation are attached. If you have questions, please do not hesitate to call me. I look forward to following your patient along with you.       Sincerely,    Kriss Horne DO
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
## 2022-03-10 NOTE — LETTER
NOTIFICATION RETURN TO WORK / SCHOOL    3/10/2022 2:24 PM    [phone]      To Whom It May Concern: Tony Mansfield is currently under the care of [facility] [address] She will return to work/school on: 3/11/2022. Limit up to 3 hours per day up to 5 days per week. Allow accommodations for work station/computer screen and lights (sunglasses/ear plugs if needed). If there are questions or concerns please have the patient contact our office.         Sincerely,      Chalo Triplett, DO

## 2022-03-10 NOTE — TELEPHONE ENCOUNTER
Pt informed of the recommendations for vertigo and medications. Pt thanked writer for call and information.

## 2022-03-10 NOTE — PATIENT INSTRUCTIONS
Search YouTube for my channel:    Dr. Shirley Vigil cuff  Low back/Piriformis  Runner's Knee  Hip Stretches  Plantar Fasciitis  IT Band  Concussion  Pes Anserine/Plica  Morfin Splints  Carpal Tunnel  Neck/Upper back

## 2022-03-14 ENCOUNTER — HOSPITAL ENCOUNTER (OUTPATIENT)
Dept: PHYSICAL THERAPY | Age: 47
Discharge: HOME | End: 2022-03-14
Attending: FAMILY MEDICINE
Payer: MEDICAID

## 2022-03-14 PROCEDURE — 97112 NEUROMUSCULAR REEDUCATION: CPT

## 2022-03-14 PROCEDURE — 97530 THERAPEUTIC ACTIVITIES: CPT

## 2022-03-14 PROCEDURE — 97116 GAIT TRAINING THERAPY: CPT

## 2022-03-14 NOTE — PROGRESS NOTES
PT DAILY TREATMENT NOTE     Patient Name: Greg Hartman  Date:3/14/2022  : 1975  [x]  Patient  Verified  Payor: Debbie De La Cruz / Plan: Owen Oakes / Product Type: Managed Care Medicaid /    In time:834am  Out time:914am  Total Treatment Time (min): 40  Visit #: 1 of 4-8     Treatment Area: Post concussion syndrome [F07.81]    SUBJECTIVE  Pain Level (0-10 scale): 1  Any medication changes, allergies to medications, adverse drug reactions, diagnosis change, or new procedure performed?: [x] No    [] Yes (see summary sheet for update)  Subjective functional status/changes:   [] No changes reported  New prism glasses have been very helpful. Woke up with slight headache but unsure if that is just the weather. Only slight dizziness this morning. OBJECTIVE     15 min Therapeutic Activity:  [x]? See flow sheet : progressed per flow sheet   Rationale: improve coordination and improve balance  to improve the patients ability to improve balance and activity tolerance      15 min Neuromuscular Re-education:  [x]?   See flow sheet : balance and vestibular ex's per flow sheet; obstacle couse with 4 tall hurdles, 1 airex beam tandem walk, cone taps x 4 B, bosu step up x 5 each   Rationale: improve coordination, improve balance and increase proprioception  to improve the patients ability to improve concussion symptoms      10 min Gait Training: dynamic gait per flow sheet   Rationale: improve balance and coordination for greater ease with shopping            With   [] TE   [] TA   [] neuro   [] other: Patient Education: [x] Review HEP    [] Progressed/Changed HEP based on:   [] positioning   [] body mechanics   [] transfers   [] heat/ice application    [] other:      Other Objective/Functional Measures: CSI 39     Pain Level (0-10 scale) post treatment: 1 dizziness    ASSESSMENT/Changes in Function: Pt does well with all set challenges today and demonstrates improved convergence from the last few weeks. Lowest CSI score to date. Recommending reduced visit to 1x/week. Patient will continue to benefit from skilled PT services to modify and progress therapeutic interventions, address functional mobility deficits, analyze and address soft tissue restrictions, analyze and cue movement patterns, analyze and modify body mechanics/ergonomics, assess and modify postural abnormalities, address imbalance/dizziness and instruct in home and community integration to attain remaining goals. []  See Plan of Care  []  See progress note/recertification  []  See Discharge Summary         Progress towards goals / Updated goals:  1. Improve FOTO to 59 to indicate improved function with daily activities.   PN: progressing, FOTO = 51   2. Decrease CSI to <=10 to indicate improved function for work tasks. PN: progressing, 48  Current: progressing, 39 (3/14/2022)  3. Improve convergence to <= 6 cm to increase ease with reading.    PN: progressing, convergence = 13cm   Current: progressing, convergence 11cm (3/14/2022)    PLAN  []  Upgrade activities as tolerated     [x]  Continue plan of care  []  Update interventions per flow sheet       []  Discharge due to:_  []  Other:_      Dalton Ramírez, PT 3/14/2022  8:38 AM    Future Appointments   Date Time Provider Shahid Mora   4/14/2022  1:40 PM DO MARISSA Roca AMB

## 2022-03-16 ENCOUNTER — APPOINTMENT (OUTPATIENT)
Dept: PHYSICAL THERAPY | Age: 47
End: 2022-03-16
Attending: FAMILY MEDICINE
Payer: MEDICAID

## 2022-03-23 ENCOUNTER — HOSPITAL ENCOUNTER (OUTPATIENT)
Dept: PHYSICAL THERAPY | Age: 47
Discharge: HOME | End: 2022-03-23
Attending: FAMILY MEDICINE
Payer: MEDICAID

## 2022-03-23 PROCEDURE — 97116 GAIT TRAINING THERAPY: CPT | Performed by: PHYSICAL THERAPIST

## 2022-03-23 PROCEDURE — 97112 NEUROMUSCULAR REEDUCATION: CPT | Performed by: PHYSICAL THERAPIST

## 2022-03-23 PROCEDURE — 97530 THERAPEUTIC ACTIVITIES: CPT | Performed by: PHYSICAL THERAPIST

## 2022-03-23 NOTE — PROGRESS NOTES
PT DAILY TREATMENT NOTE     Patient Name: Laurel Espinoza  Date:3/23/2022  : 1975  [x]  Patient  Verified  Payor: Lio Flores / Plan: [facility] MEDICAID / Product Type: Managed Care Medicaid /    In time:930  Out time:1012  Total Treatment Time (min): 42  Visit #: 2 of 4-8    Treatment Area: Post concussion syndrome [F07.81]    SUBJECTIVE  Pain Level (0-10 scale): 1/10  Any medication changes, allergies to medications, adverse drug reactions, diagnosis change, or new procedure performed?: [x] No    [] Yes (see summary sheet for update)  Subjective functional status/changes:   [] No changes reported  Pt reports she has driven [place] traffic and it was not as bad as it has been in the past.     OBJECTIVE    15 min Therapeutic Activity:  [x]  See flow sheet : increased per flow sheet   Rationale: improve coordination and improve balance  to improve the patients ability to improve balance and activity tolerance      17 min Neuromuscular Re-education:  [x]  See flow sheet : increased balance and vestibular ex's per flow sheet   Rationale: improve coordination, improve balance and increase proprioception  to improve the patients ability to decrease dizziness     10 min Gait Training: dynamic gait per flow sheet    Rationale: improve balance and coordination for greater ease with shopping          With   [] TE   [] TA   [] neuro   [] other: Patient Education: [x] Review HEP    [] Progressed/Changed HEP based on:   [] positioning   [] body mechanics   [] transfers   [] heat/ice application    [] other:      Other Objective/Functional Measures: CSI = 29     Pain Level (0-10 scale) post treatment: 1/10    ASSESSMENT/Changes in Function: Pt continues to progress and has improved convergence to 10 cm, lowest CSI to date today. Multiple LOB w/ self correction during tandem stance.      Patient will continue to benefit from skilled PT services to modify and progress therapeutic interventions, analyze and cue movement patterns, address imbalance/dizziness and instruct in home and community integration to attain remaining goals. []  See Plan of Care  []  See progress note/recertification  []  See Discharge Summary         Progress towards goals / Updated goals:  1. Improve FOTO HX 02 TE indicate improved function with daily activities.   PN: progressing, FOTO = 51   2. Decrease CSI to <=10 to indicate improved function for work tasks. PN: progressing, 48  Current: progressing, 29 (3/23/2022)  3. Improve convergence to <= 6 cm to increase ease with reading.    PN: progressing, convergence = 13cm   Current: progressing, convergence 10cm (3/23/2022)    PLAN  [x]  Upgrade activities as tolerated     []  Continue plan of care  []  Update interventions per flow sheet       []  Discharge due to:_  []  Other:_      Paxton Colon DPT 3/23/2022  9:40 AM    Future Appointments   Date Time Provider [facility]   3/28/2022  9:00 AM Orlin RICHARDSON, PT [facility]   4[facility]2022 10:30 [facility]   4/13/2022  9:30 AM [facility]   4/14/2022  1:40 PM DO MARISSA Addison AMB

## 2022-03-28 ENCOUNTER — HOSPITAL ENCOUNTER (OUTPATIENT)
Dept: PHYSICAL THERAPY | Age: 47
Discharge: HOME | End: 2022-03-28
Attending: FAMILY MEDICINE
Payer: MEDICAID

## 2022-03-28 PROCEDURE — 97112 NEUROMUSCULAR REEDUCATION: CPT

## 2022-03-28 PROCEDURE — 97116 GAIT TRAINING THERAPY: CPT

## 2022-03-28 PROCEDURE — 97530 THERAPEUTIC ACTIVITIES: CPT

## 2022-03-28 NOTE — PROGRESS NOTES
PT DAILY TREATMENT NOTE 10-18    Patient Name: Laura Whitley  Date:3/28/2022  : 1975  [x]  Patient  Verified  Payor: Carrington Huang / Plan[place] [employer] / Product Type: Managed Care Medicaid /    In time:906  Out time:943  Total Treatment Time (min): 37  Visit #: 3 of 4-8      Treatment Area: Post concussion syndrome [F07.81]    SUBJECTIVE  Pain Level (0-10 scale): 1  Any medication changes, allergies to medications, adverse drug reactions, diagnosis change, or new procedure performed?: [x] No    [] Yes (see summary sheet for update)  Subjective functional status/changes:   [] No changes reported  Very slight HA. OBJECTIVE  15 min Therapeutic Activity:  [x]  See flow sheet :   Rationale: improve coordination, improve balance and increase proprioception  to improve the patients ability to improve balance and activity tolerance     12 min Neuromuscular Re-education:  [x]  See flow sheet :   Rationale: improve coordination, improve balance and increase proprioception  to improve the patients ability to decrease dizziness  10 min Gait Training: dynamic gait per flow sheet   Rationale:  improve balance and coordination for greater ease with shopping    With   [] TE   [] TA   [] neuro   [] other: Patient Education: [x] Review HEP    [] Progressed/Changed HEP based on:   [] positioning   [] body mechanics   [] transfers   [] heat/ice application    [] other:      Other Objective/Functional Measures: CSI 42     Pain Level (0-10 scale) post treatment: 1    ASSESSMENT/Changes in Function: Slight symptom provocation with standing VOR horizontal.    Patient will continue to benefit from skilled PT services to modify and progress therapeutic interventions, analyze and cue movement patterns and address imbalance/dizziness to attain remaining goals. []  See Plan of Care  []  See progress note/recertification  []  See Discharge Summary         Progress towards goals / Updated goals:  1.  Improve FOTO JS 53 QW indicate improved function with daily activities.   PN: progressing, FOTO = 51   2. Decrease CSI to <=10 to indicate improved function for work tasks. PN: progressing, 48  Current: progressing, 29 (3/23/2022)  3. Improve convergence to <= 6 cm to increase ease with reading.    PN: progressing, convergence = 13cm   Current: progressing, convergence 10cm (3/23/2022)     PLAN  [x]  Upgrade activities as tolerated     []  Continue plan of care  []  Update interventions per flow sheet       []  Discharge due to:_  []  Other:_      Delmi Conquest, MPT, CMTPT 3/28/2022  8:43 AM    Future Appointments   Date Time Provider Shahid Amini   3/28/2022  9:00 AM [facility]   4/5/2022 10:30 AM Lui Jane, PT [facility]   4/13/2022  3:45 PM Lui Jane, PT [facility]   4/14/2022  1:40 PM DO MARISSA Sheldon AMB

## 2022-04-05 ENCOUNTER — HOSPITAL ENCOUNTER (OUTPATIENT)
Dept: PHYSICAL THERAPY | Age: 47
Discharge: HOME | End: 2022-04-05
Attending: FAMILY MEDICINE
Payer: MEDICAID

## 2022-04-05 PROCEDURE — 97116 GAIT TRAINING THERAPY: CPT

## 2022-04-05 PROCEDURE — 97112 NEUROMUSCULAR REEDUCATION: CPT

## 2022-04-05 PROCEDURE — 97530 THERAPEUTIC ACTIVITIES: CPT

## 2022-04-05 NOTE — PROGRESS NOTES
PT DAILY TREATMENT NOTE     Patient Name: Landy King  Date:2022  : 1975  [x]  Patient  Verified  Payor: Ok Kayser / Plan: [id number] / Product Type: Managed Care Medicaid /    In time:1030am  Out time:1114am  Total Treatment Time (min): 44  Visit #: 4 of 4    Treatment Area: Post concussion syndrome [F07.81]    SUBJECTIVE  Pain Level (0-10 scale): 0.5 sx  Any medication changes, allergies to medications, adverse drug reactions, diagnosis change, or new procedure performed?: [x] No    [] Yes (see summary sheet for update)  Subjective functional status/changes:   [] No changes reported  Driving will initiate vertigo when the sun and construction get intense but 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 OBJECTIVE     15 min Therapeutic Activity:  [x]? See flow sheet : increased per flow sheet   Rationale: improve coordination and improve balance  to improve the patients ability to improve balance and activity tolerance      19 min Neuromuscular Re-education:  [x]? See flow sheet : increased balance and vestibular ex's per flow sheet   Rationale: improve coordination, improve balance and increase proprioception  to improve the patients ability to decrease dizziness      10 min Gait Training: dynamic gait per flow sheet    Rationale: improve balance and coordination for greater ease with shopping           With   [] TE   [] TA   [] neuro   [] other: Patient Education: [x] Review HEP    [] Progressed/Changed HEP based on:   [] positioning   [] body mechanics   [] transfers   [] heat/ice application    [] other:      Other Objective/Functional Measures: see PN     Pain Level (0-10 scale) post treatment: 0-1 sx    ASSESSMENT/Changes in Function: Pt demonstrates improving concussion symptoms from last month 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 She will still get bouts of vertigo when she is overwhelmed by her environment.  Convergence past 10 cm flares up double vision, but otherwise her convergence glasses from her vision therapist have been helping greatly. Therapist emphasized the importance of continuing to perform daily submaximal challenge with HEP as the healing process takes time. Pt would like to keep her last schedule visit in order to receive more detailed discharge instructions, ask questions following her next vision therapy appointment, and update her HEP. Will continue x 1 visit then discharge. Patient will continue to benefit from skilled PT services to modify and progress therapeutic interventions, address functional mobility deficits, address strength deficits, analyze and address soft tissue restrictions, analyze and cue movement patterns, analyze and modify body mechanics/ergonomics, assess and modify postural abnormalities, address imbalance/dizziness and instruct in home and community integration to attain remaining goals. []  See Plan of Care  [x]  See progress note/recertification  []  See Discharge Summary         Progress towards goals / Updated goals:   1. Improve FOTO PU 29 ZS indicate improved function with daily activities.   PN: progressing, FOTO = 51   Current: remains, 56 (reduced confidence with when dizziness episodes may occur) (4/5/2022)   2. Decrease CSI to <=10 to indicate improved function for work tasks. PN: progressing, 48  Current: progressing, 23 (4/5/2022)  3. Improve convergence to <= 6 cm to increase ease with reading.    PN: progressing, convergence = 13cm    Current: progressed to 10 cm from PN (4/5/2022)       PLAN  []  Upgrade activities as tolerated     [x]  Continue plan of care  []  Update interventions per flow sheet       []  Discharge due to:_  []  Other:_      Keily Rao, PT 4/5/2022  10:32 AM    Future Appointments   Date Time Provider Shahid Mora   4/13/2022  3:45 PM Saman Clark, PT [facility]   4/14/2022  1:40 PM DO MARISSA Sanchez BS AMB

## 2022-04-05 NOTE — PROGRESS NOTES
[facility]  [address]  [place], [address]  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 Improve FOTO to 59 to indicate improved function with daily activities. PN: progressing, FOTO = 51   Current: remains, 56 (reduced confidence with when dizziness episodes may occur)    2. Decrease CSI to <=10 to indicate improved function for work tasks. PN: progressing, 48  Current: progressing, 23   3. Improve convergence to <= 6 cm to increase ease with reading. PN: progressing, convergence = 13cm    Current: progressed to 10 cm from PN     Key Functional Changes: CSI 23    Updated Goals: to be achieved in 1 weeks:  1. Improve FOTO to 59 to indicate improved function with daily activities. PN: remains, 56 (reduced confidence with when dizziness episodes may occur)    2. Decrease CSI to <=10 to indicate improved function for work tasks. PN: progressing, 23   3. Improve convergence to <= 6 cm to increase ease with reading. 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 She will still get bouts of vertigo when she is overwhelmed by her environment.  Convergence past 10 cm flares up double vision, but otherwise her convergence glasses from her vision therapist have been helping greatly. Therapist emphasized the importance of continuing to perform daily submaximal challenge with HEP as the healing process takes time. Pt would like to keep her last schedule visit in order to receive more detailed discharge instructions, ask questions following her next vision therapy appointment, and update her HEP. Will continue x 1 visit then discharge. Patient will continue to benefit from skilled PT services to modify and progress therapeutic interventions, address functional mobility deficits, address strength deficits, analyze and address soft tissue restrictions, analyze and cue movement patterns, analyze and modify body mechanics/ergonomics, assess and modify postural abnormalities, address imbalance/dizziness and instruct in home and community integration to attain remaining goals. [x]Continue therapy per initial plan/protocol at a frequency of  1 x per week for 1 weeks  []Continue therapy with the following recommended changes:_____________________      _____________________________________________________________________  []Discontinue therapy progressing towards or have reached established goals  []Discontinue therapy due to lack of appreciable progress towards goals  []Discontinue therapy due to lack of attendance or compliance  []Await Physician's recommendations/decisions regarding therapy  []Other:________________________________________________________________    Thank you for this referral.    Aleksandra Hernandez, PT 4/5/2022 11:27 AM  NOTE TO PHYSICIAN:  PLEASE COMPLETE THE ORDERS BELOW AND   FAX TO [facility]: [phone]  If you are unable to process this request in 24 hours please contact our office: [phone]    [x]  I have read the above report and request that my patient continue as recommended.   I have read the above report and request that my patient continue therapy with the following changes/special instructions:__________________________________________________________  I have read the above report and request that my patient be discharged from therapy.     Physicians signature: ______________________________Date: ______Time:______     Ernst Rosales, DO

## 2022-04-13 ENCOUNTER — TELEPHONE (OUTPATIENT)
Dept: PHYSICAL THERAPY | Age: 47
End: 2022-04-13

## 2022-04-13 ENCOUNTER — HOSPITAL ENCOUNTER (OUTPATIENT)
Dept: PHYSICAL THERAPY | Age: 47
Discharge: HOME | End: 2022-04-13
Attending: FAMILY MEDICINE
Payer: MEDICAID

## 2022-04-13 PROCEDURE — 97530 THERAPEUTIC ACTIVITIES: CPT

## 2022-04-13 PROCEDURE — 97112 NEUROMUSCULAR REEDUCATION: CPT

## 2022-04-13 PROCEDURE — 97535 SELF CARE MNGMENT TRAINING: CPT

## 2022-04-13 NOTE — PROGRESS NOTES
Physical Therapy Discharge Instructions      [facility]  [address]  [place], [address]  [phone] fax    Patient: Randee Kirby  : 1975      Continue Home Exercise Program 3 times per day for 4-8 weeks, then decrease to 3 times per week x 4 weeks. When symptoms are better, you may discontinue. Restart if they resurface.       Follow up with MD:     [] Upon completion of therapy     [x] As needed      Recommendations:     [x]   Return to activity with home program    []   Return to activity with the following modifications:       []Post Rehab Program    []Join Independent aquatic program     []Return to/join local gym      Alyne Res [place] 2022 4:23 PM
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
## 2022-04-14 ENCOUNTER — OFFICE VISIT (OUTPATIENT)
Dept: ORTHOPEDIC SURGERY | Age: 47
End: 2022-04-14
Payer: MEDICAID

## 2022-04-14 VITALS
RESPIRATION RATE: 16 BRPM | WEIGHT: 171.4 LBS | BODY MASS INDEX: 29.26 KG/M2 | HEIGHT: 64 IN | HEART RATE: 92 BPM | OXYGEN SATURATION: 97 %

## 2022-04-14 DIAGNOSIS — M99.01 CERVICAL SOMATIC DYSFUNCTION: ICD-10-CM

## 2022-04-14 DIAGNOSIS — S06.0X0D CONCUSSION WITHOUT LOSS OF CONSCIOUSNESS, SUBSEQUENT ENCOUNTER: Primary | ICD-10-CM

## 2022-04-14 DIAGNOSIS — M99.07 UPPER EXTREMITY SOMATIC DYSFUNCTION: ICD-10-CM

## 2022-04-14 DIAGNOSIS — M99.08 RIB CAGE REGION SOMATIC DYSFUNCTION: ICD-10-CM
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
## 2022-04-14 DIAGNOSIS — M99.09 SOMATIC DYSFUNCTION OF ABDOMINAL REGION: ICD-10-CM

## 2022-04-14 DIAGNOSIS — M99.02 THORACIC REGION SOMATIC DYSFUNCTION: ICD-10-CM

## 2022-04-14 PROCEDURE — 98927 OSTEOPATH MANJ 5-6 REGIONS: CPT | Performed by: FAMILY MEDICINE

## 2022-04-14 PROCEDURE — 99214 OFFICE O/P EST MOD 30 MIN: CPT | Performed by: FAMILY MEDICINE

## 2022-04-14 NOTE — LETTER
4/14/2022    Patient: Shanique Waterman   YOB: 1975   Date of Visit: 4/14/2022     Marixa Zuniga DO  [address] [facility] [address]  Via Fax: [phone]    Dear Marixa Zuniga DO,      Thank you for referring Ms. Khanh Drake to [place] [address]. for evaluation. My notes for this consultation are attached. If you have questions, please do not hesitate to call me. I look forward to following your patient along with you.       Sincerely,    Reuben Petit DO
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
## 2022-04-14 NOTE — LETTER
NOTIFICATION RETURN TO WORK / SCHOOL    4/14/2022 2:04 PM    [address] [phone]      To Whom It May Concern: Xochitl Grant is currently under the care of [facility] 2.. She will return to work/school on: 4/14/2022 Limit up to 3 hours per day up to 5 days per week. Allow accommodations for work station/computer screen and lights (sunglasses/ear plugs if needed). If there are questions or concerns please have the patient contact our office.         Sincerely,      Belem Childs, DO

## 2022-05-12 ENCOUNTER — OFFICE VISIT (OUTPATIENT)
Dept: ORTHOPEDIC SURGERY | Age: 47
End: 2022-05-12
Payer: MEDICAID

## 2022-05-12 VITALS
WEIGHT: 177.2 LBS | RESPIRATION RATE: 16 BRPM | OXYGEN SATURATION: 98 % | HEART RATE: 100 BPM | HEIGHT: 64 IN | BODY MASS INDEX: 30.25 KG/M2
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
## 2022-05-12 DIAGNOSIS — M99.02 THORACIC REGION SOMATIC DYSFUNCTION: ICD-10-CM

## 2022-05-12 DIAGNOSIS — M99.09 SOMATIC DYSFUNCTION OF ABDOMINAL REGION: ICD-10-CM

## 2022-05-12 DIAGNOSIS — M99.01 CERVICAL SOMATIC DYSFUNCTION: ICD-10-CM

## 2022-05-12 DIAGNOSIS — M99.08 RIB CAGE REGION SOMATIC DYSFUNCTION: ICD-10-CM

## 2022-05-12 DIAGNOSIS — S06.0X0D CONCUSSION WITHOUT LOSS OF CONSCIOUSNESS, SUBSEQUENT ENCOUNTER: Primary | ICD-10-CM

## 2022-05-12 DIAGNOSIS — M99.07 UPPER EXTREMITY SOMATIC DYSFUNCTION: ICD-10-CM

## 2022-05-12 PROCEDURE — 99213 OFFICE O/P EST LOW 20 MIN: CPT | Performed by: FAMILY MEDICINE

## 2022-05-12 PROCEDURE — 98927 OSTEOPATH MANJ 5-6 REGIONS: CPT | Performed by: FAMILY MEDICINE

## 2022-05-12 NOTE — LETTER
NOTIFICATION RETURN TO WORK / SCHOOL    5/12/2022 10:12 AM    Ms. [address]i [address] [phone]      To Whom It May Concern: Jackie Romano is currently under the care of [facility].. She will return to work/school on: 5/12/2022. Limit up to 4 hours per day up to 5 days per week. Allow accommodations for work station/computer screen and lights (sunglasses/ear plugs if needed). If there are questions or concerns please have the patient contact our office.         Sincerely,      Drake Yo, DO

## 2022-05-12 NOTE — LETTER
5/12/2022    Patient: Landy King   YOB: 1975   Date of Visit: 5/12/2022     Loida Huizar DO  [address] [facility] [address]  Via Fax: [phone]    Dear Loida Huizar DO,      Thank you for referring Ms. Chelsea Leigh to [place] [address]. for evaluation. My notes for this consultation are attached. If you have questions, please do not hesitate to call me. I look forward to following your patient along with you.       Sincerely,    Narendra Dutta DO
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
## 2022-06-09 ENCOUNTER — OFFICE VISIT (OUTPATIENT)
Dept: ORTHOPEDIC SURGERY | Age: 47
End: 2022-06-09
Payer: MEDICAID

## 2022-06-09 VITALS
BODY MASS INDEX: 30.35 KG/M2 | WEIGHT: 177.8 LBS | HEIGHT: 64 IN | HEART RATE: 88 BPM | OXYGEN SATURATION: 97 % | RESPIRATION RATE: 16 BRPM
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
## 2022-06-09 DIAGNOSIS — M99.09 SOMATIC DYSFUNCTION OF ABDOMINAL REGION: ICD-10-CM

## 2022-06-09 DIAGNOSIS — S06.0X0D CONCUSSION WITHOUT LOSS OF CONSCIOUSNESS, SUBSEQUENT ENCOUNTER: Primary | ICD-10-CM

## 2022-06-09 DIAGNOSIS — G43.109 MIGRAINE WITH AURA AND WITHOUT STATUS MIGRAINOSUS, NOT INTRACTABLE: ICD-10-CM

## 2022-06-09 DIAGNOSIS — M99.02 THORACIC REGION SOMATIC DYSFUNCTION: ICD-10-CM

## 2022-06-09 DIAGNOSIS — M99.08 RIB CAGE REGION SOMATIC DYSFUNCTION: ICD-10-CM

## 2022-06-09 DIAGNOSIS — M99.01 CERVICAL SOMATIC DYSFUNCTION: ICD-10-CM

## 2022-06-09 DIAGNOSIS — M99.07 UPPER EXTREMITY SOMATIC DYSFUNCTION: ICD-10-CM

## 2022-06-09 PROCEDURE — 99214 OFFICE O/P EST MOD 30 MIN: CPT | Performed by: FAMILY MEDICINE

## 2022-06-09 PROCEDURE — 98927 OSTEOPATH MANJ 5-6 REGIONS: CPT | Performed by: FAMILY MEDICINE
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
## 2022-06-09 NOTE — LETTER
NOTIFICATION RETURN TO WORK / SCHOOL    6/9/2022 10:37 AM    Ms. Bibi Benavides [phone]      To Whom It May Concern: Zonia Alegria is currently under the care of [facility].. She will return to work/school on: 6/13/2022 without limits. If there are questions or concerns please have the patient contact our office.         Sincerely,      Cong Garibay, DO

## 2022-06-09 NOTE — LETTER
6/9/2022    Patient: Sarah Reaves   YOB: 1975   Date of Visit: 6/9/2022     Benjie Hernandes DO  [address] [facility] [address]  Via Fax: [phone]    Dear Benjie Hernandes DO,      Thank you for referring Ms. Pola Lundborg to [place] [address]. for evaluation. My notes for this consultation are attached. If you have questions, please do not hesitate to call me. I look forward to following your patient along with you.       Sincerely,    Daniella Kirby, DO

## 2022-06-09 NOTE — PATIENT INSTRUCTIONS
Search YouTube for my channel:    Dr. Germania Alarcon cuff  Low back/Piriformis  Runner's Knee  Hip Stretches  Plantar Fasciitis  IT Band  Concussion  Pes Anserine/Plica  Morfin Splints  Carpal Tunnel  Neck/Upper back

## 2022-06-10 ENCOUNTER — PATIENT MESSAGE (OUTPATIENT)
Dept: NEUROLOGY | Age: 47
End: 2022-06-10

## 2022-07-25 ENCOUNTER — OFFICE VISIT (OUTPATIENT)
Dept: ORTHOPEDIC SURGERY | Age: 47
End: 2022-07-25
Payer: MEDICAID

## 2022-07-25 VITALS — WEIGHT: 174 LBS | RESPIRATION RATE: 14 BRPM | BODY MASS INDEX: 29.71 KG/M2 | HEIGHT: 64 IN

## 2022-07-25 DIAGNOSIS — M99.01 CERVICAL SOMATIC DYSFUNCTION: ICD-10-CM
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
## 2022-07-25 DIAGNOSIS — M99.08 RIB CAGE REGION SOMATIC DYSFUNCTION: ICD-10-CM

## 2022-07-25 DIAGNOSIS — M99.07 UPPER EXTREMITY SOMATIC DYSFUNCTION: ICD-10-CM

## 2022-07-25 DIAGNOSIS — M99.02 THORACIC REGION SOMATIC DYSFUNCTION: ICD-10-CM

## 2022-07-25 DIAGNOSIS — M99.09 SOMATIC DYSFUNCTION OF ABDOMINAL REGION: ICD-10-CM

## 2022-07-25 DIAGNOSIS — S06.0X0D CONCUSSION WITHOUT LOSS OF CONSCIOUSNESS, SUBSEQUENT ENCOUNTER: Primary | ICD-10-CM

## 2022-07-25 PROCEDURE — 98927 OSTEOPATH MANJ 5-6 REGIONS: CPT | Performed by: FAMILY MEDICINE

## 2022-07-25 PROCEDURE — 99214 OFFICE O/P EST MOD 30 MIN: CPT | Performed by: FAMILY MEDICINE

## 2022-07-25 NOTE — LETTER
7/25/2022    Patient: David Gonsalves   YOB: 1975   Date of Visit: 7/25/2022     Denny Whitten DO  [address] [facility] [address]  Via Fax: [phone]    Dear Denny Whitten DO,      Thank you for referring Ms. Kiana March to [place] [address]. for evaluation. My notes for this consultation are attached. If you have questions, please do not hesitate to call me. I look forward to following your patient along with you.       Sincerely,    Jorge Alberto Jose DO

## 2022-07-25 NOTE — PROGRESS NOTES
Pt states that since her last visit has had multiple days of headaches. Noticed swelling to her feet and went to Urgent care ([facility]). Pt states that her BP was elevated at 148/93. Pt states that she was having dizziness at the time of her BP being elevated. Was started on a medication for her BP.
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
## 2022-08-11 ENCOUNTER — OFFICE VISIT (OUTPATIENT)
Dept: NEUROLOGY | Age: 47
End: 2022-08-11
Payer: MEDICAID

## 2022-08-11 VITALS
HEART RATE: 93 BPM | WEIGHT: 172.4 LBS | HEIGHT: 64 IN | SYSTOLIC BLOOD PRESSURE: 108 MMHG | RESPIRATION RATE: 18 BRPM | BODY MASS INDEX: 29.43 KG/M2 | DIASTOLIC BLOOD PRESSURE: 80 MMHG | OXYGEN SATURATION: 98 %

## 2022-08-11 DIAGNOSIS — G43.109 MIGRAINE WITH AURA AND WITHOUT STATUS MIGRAINOSUS, NOT INTRACTABLE: Primary | ICD-10-CM

## 2022-08-11 PROCEDURE — 99204 OFFICE O/P NEW MOD 45 MIN: CPT | Performed by: STUDENT IN AN ORGANIZED HEALTH CARE EDUCATION/TRAINING PROGRAM

## 2022-08-11 RX ORDER — RIZATRIPTAN BENZOATE 10 MG/1
10 TABLET, ORALLY DISINTEGRATING ORAL
Qty: 10 TABLET | Refills: 3 | Status: SHIPPED | OUTPATIENT
Start: 2022-08-11 | End: 2022-08-11

## 2022-08-11 RX ORDER — ONDANSETRON 4 MG/1
4 TABLET, FILM COATED ORAL
Qty: 20 TABLET | Refills: 3 | Status: SHIPPED | OUTPATIENT
Start: 2022-08-11

## 2022-08-11 NOTE — LETTER
8/11/2022    Patient: David Gonsalves   YOB: 1975   Date of Visit: 8/11/2022     Denny Whitten DO  [address] [address]  Via Fax: [phone] [address]  Via [facility] [address]    Dear Denny Love., DO Jorge Alberto Jose DO,      Thank you for referring Ms. Kiana March to [facility] for evaluation. My notes for this consultation are attached. If you have questions, please do not hesitate to call me. I look forward to following your patient along with you.       Sincerely,    Alejandro Harris MD
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
## 2022-08-22 ENCOUNTER — HOSPITAL ENCOUNTER (OUTPATIENT)
Dept: MAMMOGRAPHY | Age: 47
Discharge: HOME | End: 2022-08-22
Attending: FAMILY MEDICINE
Payer: MEDICAID

## 2022-08-22 DIAGNOSIS — Z12.31 ENCOUNTER FOR SCREENING MAMMOGRAM FOR BREAST CANCER: ICD-10-CM

## 2022-08-22 PROCEDURE — 77063 BREAST TOMOSYNTHESIS BI: CPT

## 2022-09-06 ENCOUNTER — OFFICE VISIT (OUTPATIENT)
Dept: ORTHOPEDIC SURGERY | Age: 47
End: 2022-09-06
Payer: MEDICAID

## 2022-09-06 VITALS
HEART RATE: 82 BPM | BODY MASS INDEX: 30.05 KG/M2 | HEIGHT: 64 IN | OXYGEN SATURATION: 98 % | RESPIRATION RATE: 14 BRPM | WEIGHT: 176 LBS
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
## 2022-09-06 DIAGNOSIS — M99.06 LOWER LIMB REGION SOMATIC DYSFUNCTION: ICD-10-CM

## 2022-09-06 DIAGNOSIS — M99.07 UPPER EXTREMITY SOMATIC DYSFUNCTION: ICD-10-CM

## 2022-09-06 DIAGNOSIS — M99.02 THORACIC REGION SOMATIC DYSFUNCTION: ICD-10-CM

## 2022-09-06 DIAGNOSIS — M99.08 RIB CAGE REGION SOMATIC DYSFUNCTION: ICD-10-CM

## 2022-09-06 DIAGNOSIS — M99.05 PELVIC SOMATIC DYSFUNCTION: ICD-10-CM

## 2022-09-06 DIAGNOSIS — M99.01 CERVICAL SOMATIC DYSFUNCTION: ICD-10-CM

## 2022-09-06 DIAGNOSIS — M99.03 LUMBAR REGION SOMATIC DYSFUNCTION: ICD-10-CM

## 2022-09-06 DIAGNOSIS — S06.0X0D CONCUSSION WITHOUT LOSS OF CONSCIOUSNESS, SUBSEQUENT ENCOUNTER: Primary | ICD-10-CM

## 2022-09-06 DIAGNOSIS — M99.04 SACRAL REGION SOMATIC DYSFUNCTION: ICD-10-CM

## 2022-09-06 DIAGNOSIS — M99.09 SOMATIC DYSFUNCTION OF ABDOMINAL REGION: ICD-10-CM

## 2022-09-06 PROCEDURE — 98929 OSTEOPATH MANJ 9-10 REGIONS: CPT | Performed by: FAMILY MEDICINE

## 2022-09-06 PROCEDURE — 99213 OFFICE O/P EST LOW 20 MIN: CPT | Performed by: FAMILY MEDICINE

## 2022-09-06 RX ORDER — RIZATRIPTAN BENZOATE 10 MG/1
TABLET, ORALLY DISINTEGRATING ORAL
COMMUNITY
Start: 2022-08-11

## 2022-09-06 NOTE — LETTER
9/6/2022    Patient: Rema Lechuga   YOB: 1975   Date of Visit: 9/6/2022     Caroline Roper DO  [address] [place] [id number]  Via Fax: [phone]    Dear Caroline Roper DO,      Thank you for referring Ms. Malachi Carl to [place] [address]. for evaluation. My notes for this consultation are attached. If you have questions, please do not hesitate to call me. I look forward to following your patient along with you.       Sincerely,    [facility] Staff, DO
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
## 2022-11-11 ENCOUNTER — OFFICE VISIT (OUTPATIENT)
Dept: NEUROLOGY | Age: 47
End: 2022-11-11
Payer: MEDICAID

## 2022-11-11 VITALS
OXYGEN SATURATION: 97 % | BODY MASS INDEX: 29.6 KG/M2 | HEIGHT: 64 IN | HEART RATE: 77 BPM | RESPIRATION RATE: 18 BRPM | DIASTOLIC BLOOD PRESSURE: 74 MMHG | WEIGHT: 173.4 LBS | SYSTOLIC BLOOD PRESSURE: 122 MMHG

## 2022-11-11 DIAGNOSIS — G43.109 MIGRAINE WITH AURA AND WITHOUT STATUS MIGRAINOSUS, NOT INTRACTABLE: Primary | ICD-10-CM

## 2022-11-11 PROCEDURE — 99214 OFFICE O/P EST MOD 30 MIN: CPT | Performed by: STUDENT IN AN ORGANIZED HEALTH CARE EDUCATION/TRAINING PROGRAM

## 2022-11-11 RX ORDER — RIZATRIPTAN BENZOATE 10 MG/1
TABLET, ORALLY DISINTEGRATING ORAL
Qty: 10 TABLET | Refills: 4 | Status: SHIPPED | OUTPATIENT
Start: 2022-11-11
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
## 2023-02-02 ENCOUNTER — OFFICE VISIT (OUTPATIENT)
Dept: ORTHOPEDIC SURGERY | Age: 48
End: 2023-02-02
Payer: MEDICAID

## 2023-02-02 ENCOUNTER — HOSPITAL ENCOUNTER (OUTPATIENT)
Dept: OCCUPATIONAL MEDICINE | Age: 48
Discharge: HOME | End: 2023-02-02
Attending: FAMILY MEDICINE

## 2023-02-02 VITALS — WEIGHT: 176 LBS | BODY MASS INDEX: 30.21 KG/M2

## 2023-02-02 DIAGNOSIS — M99.09 SOMATIC DYSFUNCTION OF ABDOMINAL REGION: ICD-10-CM

## 2023-02-02 DIAGNOSIS — M99.08 RIB CAGE REGION SOMATIC DYSFUNCTION: ICD-10-CM

## 2023-02-02 DIAGNOSIS — M99.02 THORACIC REGION SOMATIC DYSFUNCTION: ICD-10-CM

## 2023-02-02 DIAGNOSIS — M99.01 CERVICAL SOMATIC DYSFUNCTION: ICD-10-CM

## 2023-02-02 DIAGNOSIS — S63.641A SPRAIN OF METACARPOPHALANGEAL (MCP) JOINT OF RIGHT THUMB, INITIAL ENCOUNTER: ICD-10-CM

## 2023-02-02 DIAGNOSIS — S63.641A SPRAIN OF METACARPOPHALANGEAL (MCP) JOINT OF RIGHT THUMB, INITIAL ENCOUNTER: Primary | ICD-10-CM

## 2023-02-02 DIAGNOSIS — M99.07 UPPER EXTREMITY SOMATIC DYSFUNCTION: ICD-10-CM

## 2023-02-02 DIAGNOSIS — M99.05 PELVIC SOMATIC DYSFUNCTION: ICD-10-CM

## 2023-02-02 DIAGNOSIS — M99.04 SACRAL REGION SOMATIC DYSFUNCTION: ICD-10-CM

## 2023-02-02 DIAGNOSIS — M99.03 LUMBAR REGION SOMATIC DYSFUNCTION: ICD-10-CM

## 2023-02-02 DIAGNOSIS — M99.06 LOWER LIMB REGION SOMATIC DYSFUNCTION: ICD-10-CM

## 2023-02-02 RX ORDER — DICLOFENAC SODIUM 10 MG/G
2 GEL TOPICAL
Qty: 100 G | Refills: 1 | Status: SHIPPED | OUTPATIENT
Start: 2023-02-02

## 2023-02-02 NOTE — PROGRESS NOTES
HISTORY OF PRESENT ILLNESS    Elaina Matthews 1975 is a 56y.o. year old female comes in today to be evaluated and treated for: right thumb pain    Since last appt has noticed pain after a couple falls and hyperextend prior but last weekend pain severe after moving things a lo tin her house. Woul dradiate to elbow as well. Pain level 1/10. Using Urgent Care appt a couple days ago and using splint and voltaren with benefit. No XR. IMAGING: XR right thumb pending    Past Surgical History:   Procedure Laterality Date    HX PARTIAL HYSTERECTOMY  2012     Social History     Socioeconomic History    Marital status:    Tobacco Use    Smoking status: Never    Smokeless tobacco: Never   Vaping Use    Vaping Use: Never used   Substance and Sexual Activity    Alcohol use: Yes     Comment: occasionally    Drug use: Never     Current Outpatient Medications   Medication Sig Dispense Refill    rizatriptan (MAXALT-MLT) 10 mg disintegrating tablet Take 1 Tablet by mouth once as needed for Migraine for up to 1 dose. 10 Tablet 4    ondansetron hcl (ZOFRAN) 4 mg tablet Take 1 Tablet by mouth every eight (8) hours as needed for Nausea or Vomiting. 20 Tablet 3    ondansetron (ZOFRAN ODT) 4 mg disintegrating tablet Take 1 Tablet by mouth every eight (8) hours as needed for Nausea or Vomiting. 20 Tablet 0     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 cerebral infarction and without status migrainosus     Optic nerve tumor      Family History   Problem Relation Age of Onset    Cancer Maternal Grandmother          ROS:  No swell, numb    Objective: Wt 176 lb (79.8 kg)   BMI 30.21 kg/m²   NEURO:  Sensation intact light touch upper and lower extremities. Biceps & Triceps reflexes +2/4 bilaterally. right hand dominant. Spurling Negative bilateral   M/S:  right elbow/wrist: Negative TTP anywhere whatsoever. Negative sarita tenderness. Phalen's negative. Tinel's negative.   Strength +5/5 bilateral .  Piano key sign Negative bilateral .  Carpal bone motion normal.  Finklestein's negative  TFCC Load Test negative. Golfer's Elbow test Negative BILATERAL Tennis Elbow Test Negative BILATERAL  negative muscular atrophy. No TTP MCL, no ligament laxity. Examined seated and supine. Slump negative. Standing flexion test positive right  Sphinx test positive left. ASIS low right  Iliac crests equal bilaterally Pubes equal bilaterally Medial malleolus low right  Sacral base posterior left  RENE low left  TTA at C4 on left worse flexion, T1, 7 on left worse flexion, and L4, 5 on left worse flexion  Rib(s) 1 TTP left and posterior LE Strength +5/5 bilaterally Piriformis normal bilaterally. Thoracic diaphragm restricted left. Scapula motion restricted w/ TTA left. Hip flexion limited left. Assessment/Plan:     ICD-10-CM ICD-9-CM    1. Sprain of metacarpophalangeal (MCP) joint of right thumb, initial encounter  S63.641A 842.12 XR THUMB RT MIN 2 V      diclofenac (VOLTAREN) 1 % gel      2. Lumbar region somatic dysfunction  M99.03 739.3 DE OSTEOPATHIC MANIPULATIVE TX 9-10 BODY REGIONS      3. Pelvic somatic dysfunction  M99.05 739.5 DE OSTEOPATHIC MANIPULATIVE TX 9-10 BODY REGIONS      4. Sacral region somatic dysfunction  M99.04 739.4 DE OSTEOPATHIC MANIPULATIVE TX 9-10 BODY REGIONS      5. Thoracic region somatic dysfunction  M99.02 739.2 DE OSTEOPATHIC MANIPULATIVE TX 9-10 BODY REGIONS      6. Rib cage region somatic dysfunction  M99.08 739.8 DE OSTEOPATHIC MANIPULATIVE TX 9-10 BODY REGIONS      7. Cervical somatic dysfunction  M99.01 739.1 DE OSTEOPATHIC MANIPULATIVE TX 9-10 BODY REGIONS      8. Upper extremity somatic dysfunction  M99.07 739.7 DE OSTEOPATHIC MANIPULATIVE TX 9-10 BODY REGIONS      9. Lower limb region somatic dysfunction  M99.06 739.6 DE OSTEOPATHIC MANIPULATIVE TX 9-10 BODY REGIONS      10.  Somatic dysfunction of abdominal region  M99.09 739.9 DE OSTEOPATHIC MANIPULATIVE TX 9-10 BODY REGIONS          Patient (or guardian if minor) verbalizes understanding of evaluation and plan. Verbal consent obtained. Cervical, Thoracic, Rib, Lumbar, Pelvic, Sacral, Upper Ext, Lower Ext, and Abdominal SD treated with myofascial, ME, and HVLA. Correction of previous malalignments verified after Tx. Pt tolerated well. Notes improvement of Sx and pain is now rated 0/10. HEP/stretches daily. Discussed stretching/strengthening/posture. Will start HEP and Rx for voltaren gel PRN  as above and plan follow-up 3-4 weeks.

## 2023-02-02 NOTE — PATIENT INSTRUCTIONS
Get a rubber band and loop around fingers and thumb. Open 10 times each hand and do exercises 3 sets a day. Squeeze tennis ball or rolled up socks 10 times a day for 3 sets each.

## 2023-02-02 NOTE — LETTER
2/2/2023    Patient: Leidy Wright   YOB: 1975   Date of Visit: 2/2/2023     Mari Shrestha DO  [address] [address]  Via Fax: [phone]    Dear Mari Shrestha DO,      Thank you for referring Ms. Jona Colbert to [place] [address]. for evaluation. My notes for this consultation are attached. If you have questions, please do not hesitate to call me. I look forward to following your patient along with you.       Sincerely,    Cortesshalonda Sommer DO

## 2023-03-02 ENCOUNTER — OFFICE VISIT (OUTPATIENT)
Age: 48
End: 2023-03-02

## 2023-03-02 VITALS — BODY MASS INDEX: 30.39 KG/M2 | WEIGHT: 178 LBS | HEIGHT: 64 IN | RESPIRATION RATE: 14 BRPM

## 2023-03-02 DIAGNOSIS — S63.641D SPRAIN OF METACARPOPHALANGEAL (MCP) JOINT OF RIGHT THUMB, SUBSEQUENT ENCOUNTER: Primary | ICD-10-CM

## 2023-03-02 DIAGNOSIS — M99.06 LOWER LIMB REGION SOMATIC DYSFUNCTION: ICD-10-CM

## 2023-03-02 DIAGNOSIS — M99.04 SACRAL REGION SOMATIC DYSFUNCTION: ICD-10-CM

## 2023-03-02 DIAGNOSIS — M99.07 UPPER EXTREMITY SOMATIC DYSFUNCTION: ICD-10-CM

## 2023-03-02 DIAGNOSIS — M99.09 SOMATIC DYSFUNCTION OF ABDOMINAL REGION: ICD-10-CM

## 2023-03-02 DIAGNOSIS — M99.03 LUMBAR REGION SOMATIC DYSFUNCTION: ICD-10-CM

## 2023-03-02 DIAGNOSIS — M99.02 THORACIC REGION SOMATIC DYSFUNCTION: ICD-10-CM

## 2023-03-02 DIAGNOSIS — M99.08 RIB CAGE REGION SOMATIC DYSFUNCTION: ICD-10-CM

## 2023-03-02 DIAGNOSIS — M99.01 CERVICAL SOMATIC DYSFUNCTION: ICD-10-CM

## 2023-03-02 DIAGNOSIS — M99.05 PELVIC SOMATIC DYSFUNCTION: ICD-10-CM

## 2023-03-02 RX ORDER — MOMETASONE FUROATE 50 UG/1
2 SPRAY, METERED NASAL DAILY
COMMUNITY

## 2023-03-02 NOTE — PROGRESS NOTES
AVS reviewed: yes,   HEP: N/A  Resources Provided: no   Patient questions/concerns answered: yes,   Patient verbalized understanding of treatment plan: yes,
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
## 2023-03-10 ENCOUNTER — OFFICE VISIT (OUTPATIENT)
Age: 48
End: 2023-03-10

## 2023-03-10 VITALS
DIASTOLIC BLOOD PRESSURE: 82 MMHG | SYSTOLIC BLOOD PRESSURE: 116 MMHG | HEIGHT: 64 IN | OXYGEN SATURATION: 98 % | WEIGHT: 174.8 LBS | RESPIRATION RATE: 20 BRPM | BODY MASS INDEX: 29.84 KG/M2 | TEMPERATURE: 98.2 F | HEART RATE: 81 BPM

## 2023-03-10 DIAGNOSIS — G43.019 MIGRAINE WITHOUT AURA, INTRACTABLE, WITHOUT STATUS MIGRAINOSUS: Primary | ICD-10-CM

## 2023-03-10 RX ORDER — TOPIRAMATE 25 MG/1
25 TABLET ORAL 2 TIMES DAILY
Qty: 60 TABLET | Refills: 4 | Status: SHIPPED | OUTPATIENT
Start: 2023-03-10 | End: 2023-04-09
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
